# Patient Record
Sex: FEMALE | Race: WHITE | URBAN - METROPOLITAN AREA
[De-identification: names, ages, dates, MRNs, and addresses within clinical notes are randomized per-mention and may not be internally consistent; named-entity substitution may affect disease eponyms.]

---

## 2020-09-08 ENCOUNTER — PROCEDURE VISIT (OUTPATIENT)
Dept: OBGYN CLINIC | Age: 30
End: 2020-09-08
Payer: COMMERCIAL

## 2020-09-08 ENCOUNTER — HOSPITAL ENCOUNTER (OUTPATIENT)
Age: 30
Setting detail: SPECIMEN
Discharge: HOME | End: 2020-09-08
Payer: COMMERCIAL

## 2020-09-08 ENCOUNTER — OFFICE VISIT (OUTPATIENT)
Dept: OBGYN CLINIC | Age: 30
End: 2020-09-08
Payer: COMMERCIAL

## 2020-09-08 VITALS
HEIGHT: 67 IN | BODY MASS INDEX: 27.94 KG/M2 | WEIGHT: 178 LBS | DIASTOLIC BLOOD PRESSURE: 68 MMHG | SYSTOLIC BLOOD PRESSURE: 108 MMHG

## 2020-09-08 LAB
ABDOMINAL CIRCUMFERENCE: NORMAL
ABO/RH: NORMAL
ABSOLUTE EOS #: 0.07 K/UL (ref 0–0.44)
ABSOLUTE IMMATURE GRANULOCYTE: <0.03 K/UL (ref 0–0.3)
ABSOLUTE LYMPH #: 1.74 K/UL (ref 1.1–3.7)
ABSOLUTE MONO #: 0.37 K/UL (ref 0.1–1.2)
AMPHETAMINE SCREEN URINE: NEGATIVE
ANTIBODY SCREEN: NEGATIVE
BARBITURATE SCREEN URINE: NEGATIVE
BASOPHILS # BLD: 0 % (ref 0–2)
BASOPHILS ABSOLUTE: <0.03 K/UL (ref 0–0.2)
BENZODIAZEPINE SCREEN, URINE: NEGATIVE
BILIRUBIN URINE: NEGATIVE
BIPARIETAL DIAMETER: NORMAL
BUPRENORPHINE URINE: NORMAL
C. TRACHOMATIS, EXTERNAL RESULT: NEGATIVE
CANNABINOID SCREEN URINE: NEGATIVE
COCAINE METABOLITE, URINE: NEGATIVE
COLOR: YELLOW
COMMENT UA: NORMAL
CONTROL: PRESENT
DIFFERENTIAL TYPE: ABNORMAL
DIRECT EXAM: NORMAL
EOSINOPHILS RELATIVE PERCENT: 1 % (ref 1–4)
ESTIMATED FETAL WEIGHT: NORMAL
FEMORAL DIAMETER: NORMAL
GLUCOSE URINE: NEGATIVE
HC/AC: NORMAL
HCT VFR BLD CALC: 44.5 % (ref 36.3–47.1)
HEAD CIRCUMFERENCE: NORMAL
HEMOGLOBIN: 14.4 G/DL (ref 11.9–15.1)
HEPATITIS B SURFACE ANTIGEN: NONREACTIVE
HEPATITIS C ANTIBODY, EXTERNAL RESULT: NONREACTIVE
HEPATITIS C ANTIBODY: NONREACTIVE
HISTORY CHECK: NORMAL
HIV AG/AB: NONREACTIVE
IMMATURE GRANULOCYTES: 0 %
KETONES, URINE: NEGATIVE
LEUKOCYTE ESTERASE, URINE: NEGATIVE
LYMPHOCYTES # BLD: 25 % (ref 24–43)
Lab: NORMAL
MCH RBC QN AUTO: 28.7 PG (ref 25.2–33.5)
MCHC RBC AUTO-ENTMCNC: 32.4 G/DL (ref 28.4–34.8)
MCV RBC AUTO: 88.6 FL (ref 82.6–102.9)
MDMA URINE: NORMAL
METHADONE SCREEN, URINE: NEGATIVE
METHAMPHETAMINE, URINE: NORMAL
MONOCYTES # BLD: 5 % (ref 3–12)
N. GONORRHOEAE, EXTERNAL RESULT: NEGATIVE
NITRITE, URINE: NEGATIVE
NRBC AUTOMATED: 0 PER 100 WBC
OPIATES, URINE: NEGATIVE
OXYCODONE SCREEN URINE: NEGATIVE
PDW BLD-RTO: 12 % (ref 11.8–14.4)
PH UA: 6.5 (ref 5–8)
PHENCYCLIDINE, URINE: NEGATIVE
PLATELET # BLD: 228 K/UL (ref 138–453)
PLATELET ESTIMATE: ABNORMAL
PMV BLD AUTO: 11.4 FL (ref 8.1–13.5)
PREGNANCY TEST URINE, POC: POSITIVE
PROPOXYPHENE, URINE: NORMAL
PROTEIN UA: NEGATIVE
RBC # BLD: 5.02 M/UL (ref 3.95–5.11)
RBC # BLD: ABNORMAL 10*6/UL
RUBV IGG SER QL: 181.4 IU/ML
SEG NEUTROPHILS: 69 % (ref 36–65)
SEGMENTED NEUTROPHILS ABSOLUTE COUNT: 4.68 K/UL (ref 1.5–8.1)
SPECIFIC GRAVITY UA: 1.02 (ref 1–1.03)
SPECIMEN DESCRIPTION: NORMAL
T. PALLIDUM, IGG: NONREACTIVE
TEST INFORMATION: NORMAL
TRICYCLIC ANTIDEPRESSANTS, UR: NORMAL
TURBIDITY: CLEAR
URINE HGB: NEGATIVE
UROBILINOGEN, URINE: NORMAL
WBC # BLD: 6.9 K/UL (ref 3.5–11.3)
WBC # BLD: ABNORMAL 10*3/UL

## 2020-09-08 PROCEDURE — 76817 TRANSVAGINAL US OBSTETRIC: CPT | Performed by: OBSTETRICS & GYNECOLOGY

## 2020-09-08 PROCEDURE — 99203 OFFICE O/P NEW LOW 30 MIN: CPT | Performed by: NURSE PRACTITIONER

## 2020-09-08 PROCEDURE — 81025 URINE PREGNANCY TEST: CPT | Performed by: NURSE PRACTITIONER

## 2020-09-08 ASSESSMENT — ENCOUNTER SYMPTOMS
COUGH: 0
CONSTIPATION: 0
ABDOMINAL PAIN: 0
ABDOMINAL DISTENTION: 0
BACK PAIN: 0
SHORTNESS OF BREATH: 0
DIARRHEA: 0

## 2020-09-08 NOTE — PROGRESS NOTES
Roselyn Luevano is a 31 y.o.  at 9w4d with Estimated Date of Delivery: 21 who presents for prenatal care. This is a planned pregnancy : Yes  Patient's last menstrual period was 2020. Certain LMP : yes      Pregnancy symptoms include fatigue, breast tenderness, nausea, \"morning sickness\", positive home pregnancy test and frequent urination  nausea without vomiting for 20 days  full time job doing Works in [place] for [employer]  Pain Score   0/10  Partner's name Carmelo  Relationship with FOB: spouse, living together. Patientdoes intend to breast feed. Pregnancy history fully reviewed. Mother's ethnicity:   Father's ethnicity:          POB:   OB History    Para Term  AB Living   2       1     SAB TAB Ectopic Molar Multiple Live Births   1                # Outcome Date GA Lbr Davon/2nd Weight Sex Delivery Anes PTL Lv   2 Current            1 SAB 2020             Complications from previous pregnancies/deliveries  Early sab  PGYN: denies STDs; denies abnormal pap smears                      Menses regular yes  Contraception none    PMH:  has no past medical history on file. PSH:  has no past surgical history on file. FH:family history is not on file. SH: denies X 3, family supportive    Review of Systems   Constitutional: Negative for appetite change and fatigue. HENT: Negative for congestion and hearing loss. Eyes: Negative for visual disturbance. Respiratory: Negative for cough and shortness of breath. Cardiovascular: Negative for chest pain and palpitations. Gastrointestinal: Negative for abdominal distention, abdominal pain, constipation and diarrhea. Genitourinary: Negative for flank pain, frequency, menstrual problem, pelvic pain and vaginal discharge. Musculoskeletal: Negative for back pain. Neurological: Negative for syncope and headaches. Psychiatric/Behavioral: Negative for behavioral problems.          Physical exam:Wt 178 lb (80.7 kg)   LMP 2020   General Appearance: alert and oriented to person,place and time, well developed and well- nourished, in no acute distress  Skin: warm and dry, no rash or erythema  Head: normocephalic and atraumatic  Eyes: extraocular eye movements intact, conjunctivae normal  ENT:  external ear and ear canal normal bilaterally, nose without deformity, nasal mucosa normal   Neck: supple and non-tender without mass, no thyromegaly or thyroid nodules, no cervical lymphadenopathy  Pulmonary/Chest: clear to auscultation bilaterally- no wheezes, rales or rhonchi, normal air movement, no respiratory distress  Cardiovascular: normal rate, regular rhythm, normal S1 and S2, no murmurs, rubs, clicks, orgallops, distal pulses intact, no carotid bruits  Abdomen: soft, non-tender, non-distended, normal bowel sounds, no masses or organomegaly  Extremities: no cyanosis, clubbing or edema  Musculoskeletal: normal rangeof motion, no joint swelling, deformity or tenderness  Neurologic: reflexes normal and symmetric, no cranial nerve deficit, gait, coordination and speech normal  Breast: without skin retraction, dimpling, puckering, nipple discharge or masses. There is no axillary adenopathy      Pelvic: external genitalia WNL's, no rashes, no lesions. Speculum exam: vaginal vault pink, wellrugated, without lesions. No discharge. Cervix without lesions. Bimanual exam: no cervical motion tenderness. Impression: @ 9w4d by LMP             There is no problem list on file for this patient. Plan:    -Papand routine cultures to lab. -Options for genetic testing : declined.    -Return to clinic 2 weeks for Ultrasound and ACOG appointments.  -Prenatal vitamins          Orders Placed This Encounter   Procedures    C.trachomatis N.gonorrhoeae DNA    Culture, Urine    VAGINITIS DNA PROBE    CBC Auto Differential    Hepatitis B Surface Antigen Obstetric Panel    Hepatitis C Antibody    HIV Screen    PAP SMEAR    Rubella antibody, IgG    T. pallidum Ab    Urinalysis    Urine Drug Screen    POCT urine pregnancy    Type and screen

## 2020-09-09 LAB
C TRACH DNA GENITAL QL NAA+PROBE: NEGATIVE
CULTURE: NORMAL
Lab: NORMAL
N. GONORRHOEAE DNA: NEGATIVE
SPECIMEN DESCRIPTION: NORMAL
SPECIMEN DESCRIPTION: NORMAL

## 2020-09-17 LAB — CYTOLOGY REPORT: NORMAL

## 2020-09-22 ENCOUNTER — INITIAL PRENATAL (OUTPATIENT)
Dept: OBGYN CLINIC | Age: 30
End: 2020-09-22

## 2020-09-22 VITALS — WEIGHT: 179.2 LBS | BODY MASS INDEX: 28.07 KG/M2 | SYSTOLIC BLOOD PRESSURE: 114 MMHG | DIASTOLIC BLOOD PRESSURE: 80 MMHG

## 2020-09-22 PROCEDURE — 0502F SUBSEQUENT PRENATAL CARE: CPT | Performed by: NURSE PRACTITIONER

## 2020-09-22 NOTE — PATIENT INSTRUCTIONS
thickness of the area at the back of the baby's neck. An increase in the thickness can be an early sign of Down syndrome. ? Chorionic villus sampling (CVS). This is a test that looks for certain genetic problems with your baby. The same genes that are in your baby are in the placenta. A small piece of the placenta is taken out and tested. This test is done when you are 10 to 13 weeks pregnant. Ease discomfort  · Slow down and take naps when you feel tired. · If your emotions swing, talk to someone. Crying, anxiety, and concentration problems are common. · If your gums bleed, try a softer toothbrush. If your gums are puffy and bleed a lot, see your dentist.  · If you feel dizzy:  ? Get up slowly after sitting or lying down. ? Drink plenty of fluids. ? Eat small snacks to keep your blood sugar stable. ? Put your head between your legs as though you were tying your shoelaces. ? Lie down with your legs higher than your head. Use pillows to prop up your feet. · If you have a headache:  ? Lie down. ? Ask your partner or a good friend for a neck massage. ? Try cool cloths over your forehead or across the back of your neck. ? Use acetaminophen (Tylenol) for pain relief. Do not use nonsteroidal anti-inflammatory drugs (NSAIDs), such as ibuprofen (Advil, Motrin) or naproxen (Aleve), unless your doctor says it is okay. · If you have a nosebleed, pinch your nose gently, and hold it for a short while. To prevent nosebleeds, try massaging a small dab of petroleum jelly, such as Vaseline, in your nostrils. · If your nose is stuffed up, try saline (saltwater) nose sprays. Do not use decongestant sprays. Care for your breasts  · Wear a bra that gives you good support. · Know that changes in your breasts are normal.  ? Your breasts may get larger and more tender. Tenderness usually gets better by 12 weeks. ? Your nipples may get darker and larger, and small bumps around your nipples may show more. ?  The veins in your chest and breasts may show more. · Don't worry about \"toughening'\" your nipples. Breastfeeding will naturally do this. Where can you learn more? Go to [email or web address] Group. org and sign in to your [employer] account. Enter M318 in the [facility] box to learn more about \"Weeks 10 to 14 of Your Pregnancy: Care Instructions. \"     If you do not have an account, please click on the \"Sign Up Now\" link. Current as of: February 11, 2020               Content Version: 12.5  © 2851-5580 [employer]. Care instructions adapted under license by [facility] ([facility]). If you have questions about a medical condition or this instruction, always ask your healthcare professional. [place] [address] any warranty or liability for your use of this information. Patient Education        Learning About When to Call Your Doctor During Pregnancy (Up to 20 Weeks)  Your Care Instructions     It's common to have concerns about what might be a problem during pregnancy. Although most pregnant women don't have any serious problems, it's important to know when to call your doctor if you have certain symptoms. These are general suggestions. Your doctor may give you some more information about when to call. When to call your doctor (up to 20 weeks)  [id number] anytime you think you may need emergency care. For example, call if:  · You passed out (lost consciousness). Call your doctor now or seek immediate medical care if:  · You have a fever. · You have vaginal bleeding. · You are dizzy or lightheaded, or you feel like you may faint. · You have symptoms of a urinary tract infection. These may include:  ? Pain or burning when you urinate. ? A frequent need to urinate without being able to pass much urine. ? Pain in the flank, which is just below the rib cage and above the waist on either side of the back. ? Blood in your urine. · You have belly pain.   · You think you are having

## 2020-09-22 NOTE — PROGRESS NOTES
-LOF, -VB  There is no problem list on file for this patient. Blood pressure 114/80, weight 179 lb 3.2 oz (81.3 kg), last menstrual period 2020. Geroldine Cabot is a 31 y.o. , here for her ACOG. The patients past medical, surgical, social and family history were reviewed. Current medications and allergies were reviewed, and documented in the chart. Menstrual history: monthly  Birth control: none    Wt Readings from Last 3 Encounters:   20 179 lb 3.2 oz (81.3 kg)   20 178 lb (80.7 kg)     Recent Results (from the past 8736 hour(s))   US OB TRANSVAGINAL    Collection Time: 20 12:00 AM   Result Value Ref Range    Biparietal Diameter      Abdominal Circumference      Femoral Diameter      Head Circumference      HC/AC      Estimated Fetal Weight     C. Trachomatis, External Result    Collection Time: 20 12:00 AM   Result Value Ref Range    C. Trachomatis, External Result NEGATIVE    N. Gonorrhoeae, External Result    Collection Time: 20 12:00 AM   Result Value Ref Range    N.  Gonorrhoeae, External Result NEGATIVE    Hepatitis C Antibody, External Result    Collection Time: 20 12:00 AM   Result Value Ref Range    Hepatitis C Antibody, External Result NONREACTIVE    POCT urine pregnancy    Collection Time: 20  7:53 AM   Result Value Ref Range    Preg Test, Ur positive     Control present    CBC Auto Differential    Collection Time: 20  8:32 AM   Result Value Ref Range    WBC 6.9 3.5 - 11.3 k/uL    RBC 5.02 3.95 - 5.11 m/uL    Hemoglobin 14.4 11.9 - 15.1 g/dL    Hematocrit 44.5 36.3 - 47.1 %    MCV 88.6 82.6 - 102.9 fL    MCH 28.7 25.2 - 33.5 pg    MCHC 32.4 28.4 - 34.8 g/dL    RDW 12.0 11.8 - 14.4 %    Platelets 418 172 - 360 k/uL    MPV 11.4 8.1 - 13.5 fL    NRBC Automated 0.0 0.0 per 100 WBC    Differential Type NOT REPORTED     Seg Neutrophils 69 (H) 36 - 65 %    Lymphocytes 25 24 - 43 %    Monocytes 5 3 - 12 %    Eosinophils % 1 1 - 4 % Basophils 0 0 - 2 %    Immature Granulocytes 0 0 %    Segs Absolute 4.68 1.50 - 8.10 k/uL    Absolute Lymph # 1.74 1.10 - 3.70 k/uL    Absolute Mono # 0.37 0.10 - 1.20 k/uL    Absolute Eos # 0.07 0.00 - 0.44 k/uL    Basophils Absolute <0.03 0.00 - 0.20 k/uL    Absolute Immature Granulocyte <0.03 0.00 - 0.30 k/uL    WBC Morphology NOT REPORTED     RBC Morphology NOT REPORTED     Platelet Estimate NOT REPORTED    Culture, Urine    Collection Time: 09/08/20  8:32 AM    Specimen: Urine, clean catch   Result Value Ref Range    Specimen Description . CLEAN CATCH URINE     Special Requests NOT REPORTED     Culture NO SIGNIFICANT GROWTH    Hepatitis B Surface Antigen Obstetric Panel    Collection Time: 09/08/20  8:32 AM   Result Value Ref Range    Hepatitis B Surface Ag NONREACTIVE NONREACTIVE   Hepatitis C Antibody    Collection Time: 09/08/20  8:32 AM   Result Value Ref Range    Hepatitis C Ab NONREACTIVE NONREACTIVE   HIV Screen    Collection Time: 09/08/20  8:32 AM   Result Value Ref Range    HIV Ag/Ab NONREACTIVE NONREACTIVE   Rubella antibody, IgG    Collection Time: 09/08/20  8:32 AM   Result Value Ref Range    Rubella Antibody, .4 IU/mL   T. pallidum Ab    Collection Time: 09/08/20  8:32 AM   Result Value Ref Range    T. pallidum, IgG NONREACTIVE NONREACTIVE   TYPE AND SCREEN    Collection Time: 09/08/20  8:32 AM   Result Value Ref Range    ABO/Rh A POSITIVE     Antibody Screen NEGATIVE     History Check NO PREVIOUS HISTORY    Urinalysis    Collection Time: 09/08/20  8:32 AM   Result Value Ref Range    Color, UA YELLOW YELLOW    Turbidity UA CLEAR CLEAR    Glucose, Ur NEGATIVE NEGATIVE    Bilirubin Urine NEGATIVE NEGATIVE    Ketones, Urine NEGATIVE NEGATIVE    Specific Gravity, UA 1.025 1.005 - 1.030    Urine Hgb NEGATIVE NEGATIVE    pH, UA 6.5 5.0 - 8.0    Protein, UA NEGATIVE NEGATIVE    Urobilinogen, Urine Normal Normal    Nitrite, Urine NEGATIVE NEGATIVE    Leukocyte Esterase, Urine NEGATIVE NEGATIVE Urinalysis Comments       Microscopic exam not performed based on chemical results unless requested in original order. Urine Drug Screen    Collection Time: 09/08/20  8:32 AM   Result Value Ref Range    Amphetamine Screen, Ur NEGATIVE NEGATIVE    Barbiturate Screen, Ur NEGATIVE NEGATIVE    Benzodiazepine Screen, Urine NEGATIVE NEGATIVE    Cocaine Metabolite, Urine NEGATIVE NEGATIVE    Methadone Screen, Urine NEGATIVE NEGATIVE    Opiates, Urine NEGATIVE NEGATIVE    Phencyclidine, Urine NEGATIVE NEGATIVE    Propoxyphene, Urine NOT REPORTED NEGATIVE    Cannabinoid Scrn, Ur NEGATIVE NEGATIVE    Oxycodone Screen, Ur NEGATIVE NEGATIVE    Methamphetamine, Urine NOT REPORTED NEGATIVE    Tricyclic Antidepressants, Urine NOT REPORTED NEGATIVE    MDMA, Urine NOT REPORTED NEGATIVE    Buprenorphine Urine NOT REPORTED NEGATIVE    Test Information       Assay provides medical screening only. The absence of expected drug(s) and/or metabolite(s) may indicate diluted or adulterated urine, limitations of testing or timing of collection. GYN Cytology    Collection Time: 09/08/20 12:06 PM   Result Value Ref Range    Cytology Report       INTERPRETATION    Cervical material, (ThinPrep vial, Imaging-assisted review):  Specimen Adequacy:       Satisfactory for evaluation.       -Endocervical/transformation zone component is absent. Descriptive Diagnosis:       Negative for intraepithelial lesion or malignancy. Cytotechnologist:   Azalea ALEJO(ASCP)  **Electronically Signed Out**  ey/9/17/2020            Source:  1: Cervical material, (ThinPrep vial, Imaging-assisted review)    Clinical History  Amenorrhea: N91.2  High risk HPV DNA testing is requested if the diagnosis is abnormal    GYNECOLOGIC CYTOLOGY REPORT    Patient Name: [facility] Rec: [id number]  Path Number: [id number]  [address] [place] [address].   [facility], [facility] [place]  [phone]  Fax: [phone]     C.trachomatis N.gonorrhoeae DNA    Collection Time: 09/08/20  4:06 PM    Specimen: Cervix   Result Value Ref Range    Specimen Description . CERVIX     C. trachomatis DNA NEGATIVE NEGATIVE    N. gonorrhoeae DNA NEGATIVE NEGATIVE   VAGINITIS DNA PROBE    Collection Time: 09/08/20  4:07 PM    Specimen: Vaginal   Result Value Ref Range    Specimen Description . VAGINA     Special Requests NOT REPORTED     Direct Exam NEGATIVE for Candida sp. Direct Exam NEGATIVE for Gardnerella vaginalis     Direct Exam NEGATIVE for Trichomonas vaginalis     Direct Exam       Method of testing is a DNA probe intended for detection and identification of Candida species, Gardnerella vaginalis, and Trichomonas vaginalis nucleic acid in vaginal fluid specimens from patients with symptoms of vaginitis/vaginosis. History reviewed. No pertinent past medical history. History reviewed. No pertinent surgical history. Family History   Problem Relation Age of Onset    Pacemaker Paternal Grandfather     Heart Surgery Maternal Grandmother      Social History     Tobacco Use   Smoking Status Never Smoker   Smokeless Tobacco Never Used     Social History     Substance and Sexual Activity   Alcohol Use Not Currently       MEDICATIONS:  Current Outpatient Medications   Medication Sig Dispense Refill    Prenatal MV & Min w/FA-DHA (PRENATAL ADULT GUMMY/DHA/FA PO) Take by mouth       No current facility-administered medications for this visit. ALLERGIES:  Patient has no known allergies. Reviewed global and practice OB care including nausea measures, nutrition, activities, warning signs, and contact information.  Offered cell free DNA screen,NT echo and WIC .    `--------------------------------------------------------------------------  Genetic Screening/Teratology Counseling  (Include patient, FOB or anyone in either family)    1) Patient's age 28 years or > at [place]: No  2) Thalassemia (Mediterranean, ): No  3) Neural Tube Defect:   No  4) Congenital heart defect:   No  5) Trisomy (e.g. Down Syndrome):  No  6) Adam-sachs ([employer]alian, [address]): No  7) Multiple Births:    No  8) Sickle cell (disease or trait):  No  9) Hemophilia or blood disorders:  No  10) Muscular Dystrophy:   No  11) Cystic Fibrosis:    No  12) Therese's chorea:   No  13) Mental retardation/Autism :  No   If yes, was person tested for fragile X: NA  14) Other inherited genetic/chromosomal disorder: No  15) Maternal metabolic disorder (DM, PKU): No  16) Child with birth defect not listed:  No  17) Recurrent pregnancy loss/stillbirth: No  18) Medications, supplements/illicit or   Recreational drugs/alcohol since LMP: No   List: none  19) Any other:   none    Comments/Counseling: declines first trimester screening  -------------------------------------------------------------------------  Infection History:    1) Live with someone with TB/exposed to TB: No  2) Patient/partner has h/o genital herpes: No  3) Rash/viral illness since LMP:  No  4) History of STD:    No  5) Other: NA  -------------------------------------------------------------------------

## 2020-10-05 ENCOUNTER — TELEPHONE (OUTPATIENT)
Dept: OBGYN CLINIC | Age: 30
End: 2020-10-05

## 2020-10-06 ENCOUNTER — ROUTINE PRENATAL (OUTPATIENT)
Dept: OBGYN CLINIC | Age: 30
End: 2020-10-06

## 2020-10-06 VITALS — BODY MASS INDEX: 28.1 KG/M2 | SYSTOLIC BLOOD PRESSURE: 122 MMHG | DIASTOLIC BLOOD PRESSURE: 84 MMHG | WEIGHT: 179.4 LBS

## 2020-10-06 PROCEDURE — 0502F SUBSEQUENT PRENATAL CARE: CPT | Performed by: NURSE PRACTITIONER

## 2020-10-06 RX ORDER — PROMETHAZINE HYDROCHLORIDE 25 MG/1
25 TABLET ORAL EVERY 8 HOURS PRN
Qty: 30 TABLET | Refills: 0 | Status: SHIPPED | OUTPATIENT
Start: 2020-10-06 | End: 2020-10-13

## 2020-10-06 NOTE — PROGRESS NOTES
-FM yet, -Ctx, -LOF, -VB  There is no problem list on file for this patient. Blood pressure 122/84, weight 179 lb 6.4 oz (81.4 kg), last menstrual period 07/03/2020. Feeling well, but still experiencing nausea. Also has been having diarrhea (on and off for a month)- thinks it may me related to foods. Usually just loose stools. Will keep a food diary  Will call on Monday if still not feeling better after using Unisom.    Phenergan sent  No FM yet

## 2020-10-06 NOTE — PATIENT INSTRUCTIONS
you did not exercise much before pregnancy, start slowly. Walking is best. Renetta Sabal yourself, and do a little more every day. · Brisk walking, easy jogging, low-impact aerobics, water aerobics, and yoga are good choices. Some sports, such as scuba diving, horseback riding, downhill skiing, gymnastics, and water skiing, are not a good idea. · Try to do at least 2½ hours a week of moderate exercise, such as a fast walk. One way to do this is to be active 30 minutes a day, at least 5 days a week. It's fine to be active in blocks of 10 minutes or more throughout your day and week. · Wear loose clothing. And wear shoes and a bra that provide good support. · Warm up and cool down to start and finish your exercise. · If you want to use weights, be sure to use light weights. They reduce stress on your joints. Stay at the best weight for you  · Experts recommend that you gain about 1 pound a month during the first 3 months of your pregnancy. · Experts recommend that you gain about 1 pound a week during your last 6 months of pregnancy, for a total weight gain of 25 to 35 pounds. · If you are underweight, you will need to gain more weight (about 28 to 40 pounds). · If you are overweight, you may not need to gain as much weight (about 15 to 25 pounds). · If you are gaining weight too fast, use common sense. Exercise every day, and limit sweets, fast foods, and fats. Choose lean meats, fruits, and vegetables. · If you are having twins or more, your doctor may refer you to a dietitian. Where can you learn more? Go to https://SimpleepeFraudwall Technologieseb.healthAIM. org and sign in to your [employer] account. Enter K892 in the [facility] box to learn more about \"Weeks 14 to 18 of Your Pregnancy: Care Instructions. \"     If you do not have an account, please click on the \"Sign Up Now\" link. Current as of: February 11, 2020               Content Version: 12.5  © 3835-3315 Healthwise, Incorporated.    Care instructions adapted under license by [facility] ([facility]). If you have questions about a medical condition or this instruction, always ask your healthcare professional. [place] [address] any warranty or liability for your use of this information. Patient Education        Learning About When to Call Your Doctor During Pregnancy (Up to 20 Weeks)  Your Care Instructions     It's common to have concerns about what might be a problem during pregnancy. Although most pregnant women don't have any serious problems, it's important to know when to call your doctor if you have certain symptoms. These are general suggestions. Your doctor may give you some more information about when to call. When to call your doctor (up to 20 weeks)  [id number] anytime you think you may need emergency care. For example, call if:  · You passed out (lost consciousness). Call your doctor now or seek immediate medical care if:  · You have a fever. · You have vaginal bleeding. · You are dizzy or lightheaded, or you feel like you may faint. · You have symptoms of a urinary tract infection. These may include:  ? Pain or burning when you urinate. ? A frequent need to urinate without being able to pass much urine. ? Pain in the flank, which is just below the rib cage and above the waist on either side of the back. ? Blood in your urine. · You have belly pain. · You think you are having contractions. · You have a sudden release of fluid from your vagina. Watch closely for changes in your health, and be sure to contact your doctor if:  · You have vaginal discharge that smells bad. · You have other concerns about your pregnancy. Follow-up care is a key part of your treatment and safety. Be sure to make and go to all appointments, and call your doctor if you are having problems. It's also a good idea to know your test results and keep a list of the medicines you take. Where can you learn more? Go to https://chpegiseleeb.health-partners. org and sign in to your [employer] account. Enter W377 in the LIANAI box to learn more about \"Learning About When to Call Your Doctor During Pregnancy (Up to 20 Weeks). \"     If you do not have an account, please click on the \"Sign Up Now\" link. Current as of: February 11, 2020               Content Version: 12.5  © 6042-0111 Healthwise, Incorporated. Care instructions adapted under license by [facility] ([facility]). If you have questions about a medical condition or this instruction, always ask your healthcare professional. [address] any warranty or liability for your use of this information.

## 2020-10-20 ENCOUNTER — ROUTINE PRENATAL (OUTPATIENT)
Dept: OBGYN CLINIC | Age: 30
End: 2020-10-20

## 2020-10-20 VITALS
BODY MASS INDEX: 29.51 KG/M2 | TEMPERATURE: 98.7 F | DIASTOLIC BLOOD PRESSURE: 64 MMHG | HEIGHT: 67 IN | SYSTOLIC BLOOD PRESSURE: 108 MMHG | WEIGHT: 188 LBS

## 2020-10-20 PROBLEM — O03.9 SPONTANEOUS ABORTION: Status: ACTIVE | Noted: 2020-10-20

## 2020-10-20 PROBLEM — Z33.1 INCIDENTAL PREGNANCY: Status: ACTIVE | Noted: 2020-10-20

## 2020-10-20 PROCEDURE — 0502F SUBSEQUENT PRENATAL CARE: CPT | Performed by: STUDENT IN AN ORGANIZED HEALTH CARE EDUCATION/TRAINING PROGRAM

## 2020-10-20 RX ORDER — PROMETHAZINE HYDROCHLORIDE 25 MG/1
25 TABLET ORAL EVERY 6 HOURS PRN
COMMUNITY
End: 2021-04-02 | Stop reason: CLARIF

## 2020-11-04 ENCOUNTER — TELEPHONE (OUTPATIENT)
Dept: OBGYN CLINIC | Age: 30
End: 2020-11-04

## 2020-11-04 NOTE — TELEPHONE ENCOUNTER
OB 17.5wk Pt calling states she is having pain at her bra level going up towards her neck and goes across her shoulders. This pain is constant. Pt has a friend that physical therapist and recommended referral to Edd Panchal for upper back pain.      Req:Referral     Pain:  -now pain 7/10  -pain ranges from 5-10 out of 10  -at times has to breath thru pain  -pain constant    POC:  -Notify provider of pt c/o back pain  -offer referral per provider approval

## 2020-11-05 ENCOUNTER — OFFICE VISIT (OUTPATIENT)
Dept: ORTHOPEDIC SURGERY | Age: 30
End: 2020-11-05
Payer: COMMERCIAL

## 2020-11-05 PROCEDURE — 99203 OFFICE O/P NEW LOW 30 MIN: CPT | Performed by: PHYSICIAN ASSISTANT

## 2020-11-05 NOTE — PROGRESS NOTES
[address] [facility], [address] [facility], [address] [place] [address], [id number] [place] [address]           Dept Phone: [phone]           Dept Fax:  [phone] 320 [facility]           [place]          Dept Phone: [phone]           Dept Fax:  [phone]      Chief Compliant:  Chief Complaint   Patient presents with   Claudette Marti    Pain     mid back pain         History of Present Illness: This is a 31 y.o. female who is approximately 18 weeks pregnant who presents for evaluation of acute worsening of chronic low back pain. Patient states she has had mid and low back pain since she was in high school approximately 15 years that has waxed and waned. She states however her pain has progressively worsened over the last 2 months without any preceding injury or trauma. Patient states she has gained some weight this year due to inactivity and as well as the pregnancy which she believes is contributing to some of his pain. Pain is most severe to the mid back left greater than right side. Pain is at the level of the bra strap. Pain seems to be worse at night as well as with activity. Pain does seem to be relieved with rest.  Patient denies any red flag symptoms. She denies any radiation of pain into the low back or lower extremities. She denies any paresthesias, saddle anesthesia, bowel or bladder dysfunction. Patient denies any recent fever or chills. Patient states she takes the occasional Tylenol which does provide mild relief of her pain. She states one of her good friends is a physical therapist who has tried some home massages as well as dry needling which provides mild temporary relief. Patient has not had any formal physical therapy otherwise.     Past History:    Current Outpatient Medications:     promethazine (PHENERGAN) 25 MG tablet, Take 25 mg by mouth every 6 hours as needed for Nausea, Disp: , Rfl:     Prenatal MV & Min w/FA-DHA (PRENATAL ADULT GUMMY/DHA/FA PO), Take by mouth, Disp: , Rfl:   No Known Allergies  Social History     Socioeconomic History    Marital status:      Spouse name: Not on file    Number of children: Not on file    Years of education: Not on file    Highest education level: Not on file   Occupational History    Not on file   Social Needs    Financial resource strain: Not on file    Food insecurity     Worry: Not on file     Inability: Not on file    Transportation needs     Medical: Not on file     Non-medical: Not on file   Tobacco Use    Smoking status: Never Smoker    Smokeless tobacco: Never Used   Substance and Sexual Activity    Alcohol use: Not Currently    Drug use: Never    Sexual activity: Yes   Lifestyle    Physical activity     Days per week: Not on file     Minutes per session: Not on file    Stress: Not on file   Relationships    Social connections     Talks on phone: Not on file     Gets together: Not on file     Attends Mandaeism service: Not on file     Active member of club or organization: Not on file     Attends meetings of clubs or organizations: Not on file     Relationship status: Not on file    Intimate partner violence     Fear of current or ex partner: Not on file     Emotionally abused: Not on file     Physically abused: Not on file     Forced sexual activity: Not on file   Other Topics Concern    Not on file   Social History Narrative    Not on file     No past medical history on file. No past surgical history on file. Family History   Problem Relation Age of Onset    Pacemaker Paternal Grandfather     Heart Surgery Maternal Grandmother         Review of Systems   Constitutional: Negative for fever, chills, sweats. Eyes: Negative for changes in vision, or pain. HENT: Negative for ear ache, epistaxis, or sore throat.   Respiratory/Cardio: Negative for Chest pain, palpitations, SOB, or cough. Gastrointestinal: Negative for abdominal pain, N/V/D. Genitourinary: Negative for dysuria, frequency, urgency, or hematuria. Neurological: Negative for headache, numbness, or weakness. Integumentary: Negative for rash, itching, laceration, or abrasion. Musculoskeletal: Positive for Established New Doctor and Pain (mid back pain )       Physical Exam:  Constitutional: Patient is oriented to person, place, and time. Patient appears well-developed and well nourished. HENT: Negative otherwise noted  Head: Normocephalic and Atraumatic  Nose: Normal  Eyes: Conjunctivae and EOM are normal  Neck: Normal range of motion Neck supple. Respiratory/Cardio: Effort normal. No respiratory distress. Musculoskeletal:    LUMBAR SPINE:  Gait:  Normal. The patient can bear weight on the injured extremity. Tenderness:   thoracic spine, mild tenderness to the mid parathoracic musculature. No tenderness to the lumbar spine or paralumbar musculature. No tenderness to the cervical spine. Edema:   absent. Back ROM:  flexion 60   Extension:   +20   Lateral Rotation:  90/90   Lateral Bendin/50   Leg Lengths:   Equal   Atrophy:    is absent   Pulses:  2+ and symmetric   Strength:  abductor 5/5; quadraceps 5/5; hamstrings 5/5; adductors 5/5; iliopsoas 5/5, anterior tibial 5/5; gastrosolues 5/5; EHL 5/5; peroneal posterior tibial 5/5   Straight Leg Raise:  Negative bilaterally   Patellar Reflexes:  2+ bilaterally   Ankle Reflexes:  2+ bilaterally     Neurological: Patient is alert and oriented to person, place, and time. Normal strenght. No sensory deficit. Skin: Skin is warm and dry  Psychiatric: Behavior is normal. Thought content normal.  Nursing note and vitals reviewed. Labs and Imaging:     XR taken today:  No results found. X-rays were unable to be obtained as patient is currently 18 weeks pregnant.         Orders Placed This Encounter   Procedures   [facility] Physical Therapy - [facility]     Referral Priority:   Routine     Referral Type:   Eval and Treat     Referral Reason:   Specialty Services Required     Requested Specialty:   Physical Therapy     Number of Visits Requested:   1       Assessment and Plan:  1. Back pain affecting pregnancy in second trimester    2. Chronic bilateral thoracic back pain          PLAN  This is a 31 y.o. female who presents to the clinic today for evaluation of acute worsening of chronic low back pain. Patient states she has had pain for approximately 15 years however over the last 2 months as she progresses in this pregnancy her pain has progressively worsened. She did displays no red flag symptoms. Denying any paresthesias, unilateral weakness, saddle anesthesia, bowel or bladder dysfunction. X-rays were unable to be obtained as patient is currently 18 weeks pregnant. She is educated that this is likely muscular in nature as she is progressing through this pregnancy. There are things that patient can do lifestyle modification lines that may help reduce this pain. I recommend supportive athletic shoes with good arch support. Patient is also recommended to alternate ice and heat at home. She is further educated that she may benefit from a formal physical therapy referral for land and/or aquatic therapy. This referral is made today. We will see patient back in 2 months for reevaluation however she may call or return sooner for any questions or concerns          Please note that this chart was generated using voice recognition Dragon dictation software. Although every effort was made to ensure the accuracy of this automated transcription, some errors in transcription may have occurred.

## 2020-11-20 ENCOUNTER — ROUTINE PRENATAL (OUTPATIENT)
Dept: OBGYN CLINIC | Age: 30
End: 2020-11-20

## 2020-11-20 ENCOUNTER — PROCEDURE VISIT (OUTPATIENT)
Dept: OBGYN CLINIC | Age: 30
End: 2020-11-20
Payer: COMMERCIAL

## 2020-11-20 VITALS
DIASTOLIC BLOOD PRESSURE: 72 MMHG | TEMPERATURE: 97.7 F | BODY MASS INDEX: 30.85 KG/M2 | SYSTOLIC BLOOD PRESSURE: 128 MMHG | WEIGHT: 197 LBS

## 2020-11-20 LAB
ABDOMINAL CIRCUMFERENCE: NORMAL
ABDOMINAL CIRCUMFERENCE: NORMAL
BIPARIETAL DIAMETER: NORMAL
BIPARIETAL DIAMETER: NORMAL
ESTIMATED FETAL WEIGHT: NORMAL
ESTIMATED FETAL WEIGHT: NORMAL
FEMORAL DIAMETER: NORMAL
FEMORAL DIAMETER: NORMAL
HC/AC: NORMAL
HC/AC: NORMAL
HEAD CIRCUMFERENCE: NORMAL
HEAD CIRCUMFERENCE: NORMAL

## 2020-11-20 PROCEDURE — 76805 OB US >/= 14 WKS SNGL FETUS: CPT | Performed by: OBSTETRICS & GYNECOLOGY

## 2020-11-20 PROCEDURE — 0502F SUBSEQUENT PRENATAL CARE: CPT | Performed by: NURSE PRACTITIONER

## 2020-11-20 PROCEDURE — 76817 TRANSVAGINAL US OBSTETRIC: CPT | Performed by: OBSTETRICS & GYNECOLOGY

## 2020-11-20 NOTE — PATIENT INSTRUCTIONS
Patient Education        Weeks 18 to 22 of Your Pregnancy: Care Instructions  Your Care Instructions     Your baby is continuing to develop quickly. At this stage, babies can now suck their thumbs,  firmly with their hands, and open and close their eyelids. Sometime between 18 and 22 weeks, you will start to feel your baby move. At first, these small fetal movements feel like fluttering or \"butterflies. \" Some women say that they feel like gas bubbles. As the baby grows, these movements will become stronger. You may also notice that your baby kicks and hiccups. During this time, you may find that your nausea and fatigue are gone. Overall, you may feel better and have more energy than you did in your first trimester. But you may also have new discomforts now, such as sleep problems or leg cramps. This care sheet can help you ease these discomforts. Follow-up care is a key part of your treatment and safety. Be sure to make and go to all appointments, and call your doctor if you are having problems. It's also a good idea to know your test results and keep a list of the medicines you take. How can you care for yourself at home? Ease sleep problems  · Avoid caffeine in drinks or chocolate late in the day. · Get some exercise every day. · Take a warm shower or bath before bed. · Have a light snack or glass of milk at bedtime. · Do relaxation exercises in bed to calm your mind and body. · Support your legs and back with extra pillows. Try a pillow between your legs if you sleep on your side. · Do not use sleeping pills or alcohol. They could harm your baby. Ease leg cramps  · Do not massage your calf during the cramp. · Sit on a firm bed or chair. Straighten your leg, and bend your foot (flex your ankle) slowly upward, toward your knee. Bend your toes up and down. · Stand on a cool, flat surface. Stretch your toes upward, and take small steps walking on your heels.   · Use a heating pad or hot water bottle to help with muscle ache. Prevent leg cramps  · Be sure to get enough calcium. If you are worried that you are not getting enough, talk to your doctor. · Exercise every day, and stretch your legs before bed. · Take a warm bath before bed, and try leg warmers at night. Where can you learn more? Go to https://chpearaceli.healthIncentOne. org and sign in to your Stat Doctors account. Enter R705 in the DriverSide box to learn more about \"Weeks 18 to 22 of Your Pregnancy: Care Instructions. \"     If you do not have an account, please click on the \"Sign Up Now\" link. Current as of: February 11, 2020               Content Version: 12.6  © 2006-2020 RetailMLS. Care instructions adapted under license by [facility] ([facility]). If you have questions about a medical condition or this instruction, always ask your healthcare professional. [place] [address] any warranty or liability for your use of this information. Patient Education        Counting Your Baby's Kicks: Care Instructions  Your Care Instructions     Counting your baby's kicks is one way your doctor can tell that your baby is healthy. Most women--especially in a first pregnancy--feel their baby move for the first time between 16 and 22 weeks. The movement may feel like flutters rather than kicks. Your baby may move more at certain times of the day. When you are active, you may notice less kicking than when you are resting. At your prenatal visits, your doctor will ask whether the baby is active. In your last trimester, your doctor may ask you to count the number of times you feel your baby move. Follow-up care is a key part of your treatment and safety. Be sure to make and go to all appointments, and call your doctor if you are having problems. It's also a good idea to know your test results and keep a list of the medicines you take. How do you count fetal kicks?   · A common method of checking your baby's movement is to count the number of kicks or moves you feel in 1 hour. Ten movements (such as kicks, flutters, or rolls) in 1 hour are normal. Some doctors suggest that you count in the morning until you get to 10 movements. Then you can quit for that day and start again the next day. · Pick your baby's most active time of day to count. This may be any time from morning to evening. · If you do not feel 10 movements in an hour, your baby may be sleeping. Wait for the next hour and count again. When should you call for help? Call your doctor now or seek immediate medical care if:    · You noticed that your baby has stopped moving or is moving much less than normal.   Watch closely for changes in your health, and be sure to contact your doctor if you have any problems. Where can you learn more? Go to https://Luxe InternacionalepePublicBeta.NovaRay Medical. org and sign in to your iPerceptions account. Enter L534 in the Qianmi box to learn more about \"Counting Your Baby's Kicks: Care Instructions. \"     If you do not have an account, please click on the \"Sign Up Now\" link. Current as of: February 11, 2020               Content Version: 12.6  © 8093-4702 TripChamp. Care instructions adapted under license by [facility] ([facility]). If you have questions about a medical condition or this instruction, always ask your healthcare professional. [place] [address] any warranty or liability for your use of this information. Patient Education        Learning About When to Call Your Doctor During Pregnancy (After 20 Weeks)  Your Care Instructions  It's common to have concerns about what might be a problem during pregnancy. Although most pregnant women don't have any serious problems, it's important to know when to call your doctor if you have certain symptoms or signs of labor. These are general suggestions. Your doctor may give you some more information about when to call.   When to call your doctor (after 20 weeks)  Call 911 anytime you think you may need emergency care. For example, call if:  · You have severe vaginal bleeding. · You have sudden, severe pain in your belly. · You passed out (lost consciousness). · You have a seizure. · You see or feel the umbilical cord. · You think you are about to deliver your baby and can't make it safely to the hospital.  Call your doctor now or seek immediate medical care if:  · You have vaginal bleeding. · You have belly pain. · You have a fever. · You have symptoms of preeclampsia, such as:  ? Sudden swelling of your face, hands, or feet. ? New vision problems (such as dimness, blurring, or seeing spots). ? A severe headache. · You have a sudden release of fluid from your vagina. (You think your water broke.)  · You think that you may be in labor. This means that you've had at least 6 contractions in an hour. · You notice that your baby has stopped moving or is moving much less than normal.  · You have symptoms of a urinary tract infection. These may include:  ? Pain or burning when you urinate. ? A frequent need to urinate without being able to pass much urine. ? Pain in the flank, which is just below the rib cage and above the waist on either side of the back. ? Blood in your urine. Watch closely for changes in your health, and be sure to contact your doctor if:  · You have vaginal discharge that smells bad. · You have skin changes, such as:  ? A rash. ? Itching. ? Yellow color to your skin. · You have other concerns about your pregnancy. If you have labor signs at 37 weeks or more  If you have signs of labor at 37 weeks or more, your doctor may tell you to call when your labor becomes more active. Symptoms of active labor include:  · Contractions that are regular. · Contractions that are less than 5 minutes apart. · Contractions that are hard to talk through. Follow-up care is a key part of your treatment and safety.  Be sure to make and go to all appointments, and call your doctor if you are having problems. It's also a good idea to know your test results and keep a list of the medicines you take. Where can you learn more? Go to https://chpehugoeweb.IsoPlexis. org and sign in to your NGI account. Enter  in the [facility] box to learn more about \"Learning About When to Call Your Doctor During Pregnancy (After 20 Weeks). \"     If you do not have an account, please click on the \"Sign Up Now\" link. Current as of: February 11, 2020               Content Version: 12.6  © 2006-2020 TripChamp, Networked Organisms. Care instructions adapted under license by [facility] ([facility]). If you have questions about a medical condition or this instruction, always ask your healthcare professional. [address] any warranty or liability for your use of this information. Patient Education        High-Fiber Diet: Care Instructions  Your Care Instructions     A high-fiber diet may help you relieve constipation and feel less bloated. Your doctor and dietitian will help you make a high-fiber eating plan based on your personal needs. The plan will include the things you like to eat. It will also make sure that you get 30 grams of fiber a day. Before you make changes to the way you eat, be sure to talk with your doctor or dietitian. Follow-up care is a key part of your treatment and safety. Be sure to make and go to all appointments, and call your doctor if you are having problems. It's also a good idea to know your test results and keep a list of the medicines you take. How can you care for yourself at home? · You can increase how much fiber you get if you eat more of certain foods. These foods include:  ? Whole-grain breads and cereals. ? Fruits, such as pears, apples, and peaches. Eat the skins, peels, and seeds, if you can.  ? Vegetables, such as broccoli, cabbage, spinach, carrots, asparagus, and squash. ? Starchy vegetables. These include potatoes with skins, kidney beans, and lima beans. · Take a fiber supplement every day if your doctor recommends it. Examples are Benefiber, Citrucel, FiberCon, and Metamucil. Ask your doctor how much to take. · Drink plenty of fluids, enough so that your urine is light yellow or clear like water. If you have kidney, heart, or liver disease and have to limit fluids, talk with your doctor before you increase the amount of fluids you drink. · Get some exercise every day. Exercise helps stool move through the colon. It also helps prevent constipation. · Keep a food diary. Try to notice and write down what foods cause gas, pain, or other symptoms. Then you can avoid these foods. Where can you learn more? Go to https://360GuanxipeSideStripe.REALTIME.CO. org and sign in to your Adzilla account. Enter Y734 in the Reality Mobile box to learn more about \"High-Fiber Diet: Care Instructions. \"     If you do not have an account, please click on the \"Sign Up Now\" link. Current as of: August 22, 2019               Content Version: 12.6  © 4892-6449 etouches, Incorporated. Care instructions adapted under license by [facility] ([facility]). If you have questions about a medical condition or this instruction, always ask your healthcare professional. [address] any warranty or liability for your use of this information.

## 2020-11-20 NOTE — PROGRESS NOTES
+FM, -Ctx, -LOF, -VB  Patient Active Problem List   Diagnosis    Rh+/RI/GBSunk    Hx SAB x 1 (G1)     Blood pressure 128/72, temperature 97.7 °F (36.5 °C), weight 197 lb (89.4 kg), last menstrual period 07/03/2020. Reviewed kick counts, labor and pre eclampsia precautions  Feeling well  Anatomy scan complete- unremarkable  Noticing some movements  S/p flu  Having increasing back pain- low. Discussed referral for back brace. Pt agreeable. Also c/o some shoulder pain L>R.   Pt following all Covid-19 recommendations

## 2020-12-18 ENCOUNTER — ROUTINE PRENATAL (OUTPATIENT)
Dept: OBGYN CLINIC | Age: 30
End: 2020-12-18
Payer: COMMERCIAL

## 2020-12-18 VITALS
DIASTOLIC BLOOD PRESSURE: 68 MMHG | HEIGHT: 67 IN | TEMPERATURE: 98.3 F | SYSTOLIC BLOOD PRESSURE: 112 MMHG | WEIGHT: 201.6 LBS | BODY MASS INDEX: 31.64 KG/M2

## 2020-12-18 PROCEDURE — 99213 OFFICE O/P EST LOW 20 MIN: CPT | Performed by: STUDENT IN AN ORGANIZED HEALTH CARE EDUCATION/TRAINING PROGRAM

## 2020-12-18 NOTE — PROGRESS NOTES
Prenatal Visit    Andrés Devlin is a 31 y.o. female  at 18w0d    The patient was seen and evaluated. Reports positive fetal movements. She denies headache, vision changes, RUQ pain, contractions, vaginal bleeding and leakage of fluid. The patient already received the influenza vaccine this year. The problem list reflects the active issues addressed during today's visit    Vitals:    BP: 112/68  Weight: 201 lb 9.6 oz (91.4 kg)  Fundal Height (cm): 24 cm  Fetal Heart Rate: 145  Movement: Present     Labs: The patient is RH +, Rhogam not indicated  ABO/Rh   Date Value Ref Range Status   2020 A POSITIVE  Final       Assessment & Plan:  Andrés Devlin is a 31 y.o. female  at 18w0d   - The patients anatomy ultrasound has been completed and reviewed with patient. - 28 week labs to be ordered at next appt   -  labor and kick count precautions given. - Signs and symptoms of preeclampsia reviewed. Patient Active Problem List    Diagnosis Date Noted    Rh+/RI/GBSunk 10/20/2020    Hx SAB x 1 (G1) 10/20/2020     Return in about 4 weeks (around 1/15/2021) for LU. The patient was instructed on fetal kick counts and was given a kick sheet to complete every 8 hours. This is to begin at 28 weeks gestation. She was instructed that the baby should move at a minimum of ten times within one hour after a meal. The patient was instructed to lay down on her left side twenty minutes after eating and count movements for up to one hour with a target value of ten movements. She was instructed to notify the office if she did not make that target after two attempts or if after any attempt there was less than four movements.     [place] Arzola Koi [facility] Ob/Gyn   2020, 9:13 AM

## 2021-01-12 ENCOUNTER — TELEPHONE (OUTPATIENT)
Dept: OBGYN CLINIC | Age: 31
End: 2021-01-12

## 2021-01-12 RX ORDER — OMEPRAZOLE 20 MG/1
20 CAPSULE, DELAYED RELEASE ORAL
Qty: 180 CAPSULE | Refills: 1 | Status: SHIPPED | OUTPATIENT
Start: 2021-01-12 | End: 2021-04-02 | Stop reason: CLARIF

## 2021-01-12 RX ORDER — ONDANSETRON 8 MG/1
8 TABLET, ORALLY DISINTEGRATING ORAL EVERY 8 HOURS PRN
Qty: 12 TABLET | Refills: 1 | Status: SHIPPED | OUTPATIENT
Start: 2021-01-12 | End: 2021-04-02 | Stop reason: CLARIF

## 2021-01-12 NOTE — TELEPHONE ENCOUNTER
OB 27.4wk Pt states she is having severe heartburn, every time she eats fruits, vegtables coughs and at times vomits. Burning choking feeling for several weeks now. Vomits straight acid. Other findings:  -No problems with carbs  -states she drinks water and no pop    Pharmacy:  [facility]    Pt asking what she can take?     Advised:  -Reviewed meds on OB list.   -eat small frequent meals  -Next office visit 01/15/21

## 2021-01-15 ENCOUNTER — ROUTINE PRENATAL (OUTPATIENT)
Dept: OBGYN CLINIC | Age: 31
End: 2021-01-15
Payer: COMMERCIAL

## 2021-01-15 VITALS
SYSTOLIC BLOOD PRESSURE: 112 MMHG | TEMPERATURE: 98.4 F | WEIGHT: 207 LBS | DIASTOLIC BLOOD PRESSURE: 64 MMHG | BODY MASS INDEX: 32.49 KG/M2 | HEIGHT: 67 IN

## 2021-01-15 DIAGNOSIS — Z3A.28 28 WEEKS GESTATION OF PREGNANCY: Primary | ICD-10-CM

## 2021-01-15 DIAGNOSIS — Z23 NEED FOR TDAP VACCINATION: ICD-10-CM

## 2021-01-15 DIAGNOSIS — K21.9 GASTROESOPHAGEAL REFLUX DISEASE WITHOUT ESOPHAGITIS: ICD-10-CM

## 2021-01-15 PROCEDURE — 90471 IMMUNIZATION ADMIN: CPT | Performed by: STUDENT IN AN ORGANIZED HEALTH CARE EDUCATION/TRAINING PROGRAM

## 2021-01-15 PROCEDURE — 0502F SUBSEQUENT PRENATAL CARE: CPT | Performed by: STUDENT IN AN ORGANIZED HEALTH CARE EDUCATION/TRAINING PROGRAM

## 2021-01-15 PROCEDURE — 90715 TDAP VACCINE 7 YRS/> IM: CPT | Performed by: STUDENT IN AN ORGANIZED HEALTH CARE EDUCATION/TRAINING PROGRAM

## 2021-01-15 RX ORDER — OMEPRAZOLE 20 MG/1
20 TABLET, DELAYED RELEASE ORAL DAILY
Qty: 30 TABLET | Refills: 3 | Status: SHIPPED | OUTPATIENT
Start: 2021-01-15 | End: 2021-04-02 | Stop reason: CLARIF

## 2021-01-15 NOTE — PROGRESS NOTES
After obtaining consent, and per orders of Dr. Jenifer Carpenter, injection of Tdap 0.5mL given in Right deltoid IM by Eddie Batista. Patient instructed to remain in clinic for 20 minutes afterwards, and to report any adverse reaction to me immediately.     Andrei Hein 47 [id number]  LOT [id number]  EXP 10-28-22

## 2021-01-15 NOTE — PROGRESS NOTES
Prenatal Visit    Candace Ferrer is a 31 y.o. female  at 31w0d    The patient was seen and evaluated. Reports positive fetal movements. She denies headache, vision changes, RUQ pain, contractions, vaginal bleeding and leakage of fluid. Complaining of GERD symptoms. PPI sent to pharmacy on file. The patient was instructed on fetal kick counts and was given a kick sheet to complete every 8 hours. She was instructed that the baby should move at a minimum of ten times within one hour after a meal. The patient was instructed to lay down on her left side twenty minutes after eating and count movements for up to one hour with a target value of ten movements. She was instructed to notify the office if she did not make that target after two attempts or if after any attempt there was less than four movements. The patient admits to that the targets have been made. The patient requested the T-Dap Vaccine (27-36 weeks) this pregnancy. The patient already received the influenza vaccine this year. The problem list reflects the active issues addressed during today's visit    Vitals:    BP: 112/64  Weight: 207 lb (93.9 kg)  Fundal Height (cm): 28 cm  Fetal Heart Rate: 152  Movement: Present     28 Week Labs: The patient is RH +, Rhogam not indicated  ABO/Rh   Date Value Ref Range Status   2020 A POSITIVE  Final       1hr GTT: ordered   28 week CBC:   Lab Results   Component Value Date    WBC 6.9 2020    HGB 14.4 2020    HCT 44.5 2020    MCV 88.6 2020     2020     UA w/ Ur C&S: ordered     Assessment & Plan:  Candace Ferrer is a 31 y.o. female  at 31w0d   - 28 week labs ordered   - discussed recommendations for TDAP immunization, patient requested TDAP.   - Complaining of GERD symptoms. PPI sent to pharmacy on file. -  labor and kick count precautions given. - Signs and symptoms of preeclampsia reviewed.     Patient Active Problem List    Diagnosis Date Noted    Rh+/RI/GBSunk 10/20/2020    Hx SAB x 1 (G1) 10/20/2020     Return in about 2 weeks (around 2021) for LU. The patient was counseled on Labor & Delivery. Route of delivery and counseling on vaginal, operative vaginal, and  sections were completed with the risks of each to both the patient as well as her baby. The possibility of a blood transfusion was discussed as well. The patient was not opposed to receiving a transfusion if needed. The patient was counseled on types of analgesia during labor. The patient has been instructed to call the office at anytime prior to going into the hospital so the on-call physician may direct her to the appropriate facility for care. Exceptions were reviewed including but not limited to: Decreased fetal movement, vaginal Bleeding or hemorrhage, trauma, readily expectant delivery, or any instance where she feels 911 should be utilized.     Laurel Runner, [facility]   1/15/2021, 9:02 AM

## 2021-01-28 ENCOUNTER — HOSPITAL ENCOUNTER (OUTPATIENT)
Age: 31
Setting detail: SPECIMEN
Discharge: HOME | End: 2021-01-28
Payer: COMMERCIAL

## 2021-01-28 DIAGNOSIS — Z3A.28 28 WEEKS GESTATION OF PREGNANCY: ICD-10-CM

## 2021-01-28 DIAGNOSIS — D50.8 IRON DEFICIENCY ANEMIA SECONDARY TO INADEQUATE DIETARY IRON INTAKE: Primary | ICD-10-CM

## 2021-01-28 LAB
ABSOLUTE EOS #: 0.11 K/UL (ref 0–0.44)
ABSOLUTE IMMATURE GRANULOCYTE: 0.18 K/UL (ref 0–0.3)
ABSOLUTE LYMPH #: 1.37 K/UL (ref 1.1–3.7)
ABSOLUTE MONO #: 0.45 K/UL (ref 0.1–1.2)
BASOPHILS # BLD: 0 % (ref 0–2)
BASOPHILS ABSOLUTE: <0.03 K/UL (ref 0–0.2)
DIFFERENTIAL TYPE: ABNORMAL
EOSINOPHILS RELATIVE PERCENT: 1 % (ref 1–4)
GLUCOSE ADMINISTRATION: NORMAL
GLUCOSE TOLERANCE SCREEN 50G: 84 MG/DL (ref 70–135)
HCT VFR BLD CALC: 32.9 % (ref 36.3–47.1)
HEMOGLOBIN: 10.2 G/DL (ref 11.9–15.1)
IMMATURE GRANULOCYTES: 2 %
LYMPHOCYTES # BLD: 13 % (ref 24–43)
MCH RBC QN AUTO: 28.7 PG (ref 25.2–33.5)
MCHC RBC AUTO-ENTMCNC: 31 G/DL (ref 28.4–34.8)
MCV RBC AUTO: 92.7 FL (ref 82.6–102.9)
MONOCYTES # BLD: 4 % (ref 3–12)
NRBC AUTOMATED: 0 PER 100 WBC
PDW BLD-RTO: 11.9 % (ref 11.8–14.4)
PLATELET # BLD: 198 K/UL (ref 138–453)
PLATELET ESTIMATE: ABNORMAL
PMV BLD AUTO: 11.5 FL (ref 8.1–13.5)
RBC # BLD: 3.55 M/UL (ref 3.95–5.11)
RBC # BLD: ABNORMAL 10*6/UL
SEG NEUTROPHILS: 80 % (ref 36–65)
SEGMENTED NEUTROPHILS ABSOLUTE COUNT: 8.55 K/UL (ref 1.5–8.1)
WBC # BLD: 10.7 K/UL (ref 3.5–11.3)
WBC # BLD: ABNORMAL 10*3/UL

## 2021-01-28 RX ORDER — DOCUSATE SODIUM 100 MG/1
100 CAPSULE, LIQUID FILLED ORAL 2 TIMES DAILY
Qty: 60 CAPSULE | Refills: 3 | Status: SHIPPED | OUTPATIENT
Start: 2021-01-28 | End: 2021-04-02 | Stop reason: CLARIF

## 2021-01-28 RX ORDER — LANOLIN ALCOHOL/MO/W.PET/CERES
325 CREAM (GRAM) TOPICAL 2 TIMES DAILY
Qty: 90 TABLET | Refills: 3 | Status: ON HOLD | OUTPATIENT
Start: 2021-01-28 | End: 2021-04-17 | Stop reason: HOSPADM

## 2021-01-29 ENCOUNTER — ROUTINE PRENATAL (OUTPATIENT)
Dept: OBGYN CLINIC | Age: 31
End: 2021-01-29

## 2021-01-29 VITALS
SYSTOLIC BLOOD PRESSURE: 126 MMHG | HEIGHT: 67 IN | BODY MASS INDEX: 33.12 KG/M2 | TEMPERATURE: 97.5 F | WEIGHT: 211 LBS | DIASTOLIC BLOOD PRESSURE: 74 MMHG

## 2021-01-29 DIAGNOSIS — Z3A.30 30 WEEKS GESTATION OF PREGNANCY: Primary | ICD-10-CM

## 2021-01-29 PROCEDURE — 0502F SUBSEQUENT PRENATAL CARE: CPT | Performed by: STUDENT IN AN ORGANIZED HEALTH CARE EDUCATION/TRAINING PROGRAM

## 2021-01-29 NOTE — PROGRESS NOTES
LU.      The patient was counseled on Labor & Delivery. Route of delivery and counseling on vaginal, operative vaginal, and  sections were completed with the risks of each to both the patient as well as her baby. The possibility of a blood transfusion was discussed as well. The patient was not opposed to receiving a transfusion if needed. The patient was counseled on types of analgesia during labor. The patient has been instructed to call the office at anytime prior to going into the hospital so the on-call physician may direct her to the appropriate facility for care. Exceptions were reviewed including but not limited to: Decreased fetal movement, vaginal Bleeding or hemorrhage, trauma, readily expectant delivery, or any instance where she feels 911 should be utilized.     Kenya Castilloi [facility]   2021, 9:05 AM

## 2021-02-12 ENCOUNTER — ROUTINE PRENATAL (OUTPATIENT)
Dept: OBGYN CLINIC | Age: 31
End: 2021-02-12

## 2021-02-12 VITALS — WEIGHT: 213 LBS | DIASTOLIC BLOOD PRESSURE: 74 MMHG | BODY MASS INDEX: 33.36 KG/M2 | SYSTOLIC BLOOD PRESSURE: 122 MMHG

## 2021-02-12 DIAGNOSIS — Z33.1 INCIDENTAL PREGNANCY: Primary | ICD-10-CM

## 2021-02-12 DIAGNOSIS — Z3A.32 32 WEEKS GESTATION OF PREGNANCY: ICD-10-CM

## 2021-02-12 PROCEDURE — 0502F SUBSEQUENT PRENATAL CARE: CPT | Performed by: OBSTETRICS & GYNECOLOGY

## 2021-02-12 NOTE — PROGRESS NOTES
Chief complaint: Here for Prenatal Visit    +FM, -ctxns, -VB, -LOF    /74   Wt 213 lb (96.6 kg)   LMP 07/03/2020   BMI 33.36 kg/m²       Gen-NAD  CVS-RRR  Resp-nonlabored  Abd-soft, nontender, gravid  Ext-no edema      ICD-10-CM    1.  Rh+/RI/GBSunk  Z33.1    2. 32 weeks gestation of pregnancy  Z3A.32        S/p Tdap 1/15/21  Glucose WNL, Hg a bit low at 10.2  Struggling with heartburn, but slightly improved with Omeprazole  Discussed getting a breast pump

## 2021-02-23 ENCOUNTER — HOSPITAL ENCOUNTER (OUTPATIENT)
Age: 31
Setting detail: OBSERVATION
Discharge: HOME | End: 2021-02-24
Attending: STUDENT IN AN ORGANIZED HEALTH CARE EDUCATION/TRAINING PROGRAM | Admitting: STUDENT IN AN ORGANIZED HEALTH CARE EDUCATION/TRAINING PROGRAM
Payer: OTHER MISCELLANEOUS

## 2021-02-23 ENCOUNTER — HOSPITAL ENCOUNTER (EMERGENCY)
Facility: CLINIC | Age: 31
Discharge: HOME | End: 2021-02-23
Attending: EMERGENCY MEDICINE
Payer: OTHER MISCELLANEOUS

## 2021-02-23 VITALS
RESPIRATION RATE: 18 BRPM | TEMPERATURE: 98.3 F | WEIGHT: 212 LBS | HEART RATE: 109 BPM | DIASTOLIC BLOOD PRESSURE: 82 MMHG | BODY MASS INDEX: 33.27 KG/M2 | OXYGEN SATURATION: 98 % | HEIGHT: 67 IN | SYSTOLIC BLOOD PRESSURE: 124 MMHG

## 2021-02-23 DIAGNOSIS — S60.222A CONTUSION OF LEFT HAND, INITIAL ENCOUNTER: Primary | ICD-10-CM
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
## 2021-02-23 DIAGNOSIS — Z34.93 THIRD TRIMESTER PREGNANCY AT LESS THAN 36 WEEKS: ICD-10-CM

## 2021-02-23 PROCEDURE — 99284 EMERGENCY DEPT VISIT MOD MDM: CPT

## 2021-02-23 ASSESSMENT — PAIN SCALES - GENERAL: PAINLEVEL_OUTOF10: 4

## 2021-02-23 ASSESSMENT — PAIN DESCRIPTION - ORIENTATION: ORIENTATION: LOWER

## 2021-02-23 ASSESSMENT — PAIN DESCRIPTION - PAIN TYPE: TYPE: ACUTE PAIN

## 2021-02-23 ASSESSMENT — PAIN DESCRIPTION - LOCATION: LOCATION: ABDOMEN

## 2021-02-23 ASSESSMENT — PAIN DESCRIPTION - DESCRIPTORS: DESCRIPTORS: CRAMPING

## 2021-02-23 ASSESSMENT — ENCOUNTER SYMPTOMS: SHORTNESS OF BREATH: 0

## 2021-02-23 NOTE — ED NOTES
Patient was able to walk down the bates to the restroom and back without any issues. Patient c/o tailbone pain but her abdominal cramping has improved.      Mela Urbina RN  02/23/21 [id number]
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
## 2021-02-24 VITALS
DIASTOLIC BLOOD PRESSURE: 50 MMHG | TEMPERATURE: 98.2 F | HEART RATE: 97 BPM | RESPIRATION RATE: 20 BRPM | SYSTOLIC BLOOD PRESSURE: 108 MMHG

## 2021-02-24 PROBLEM — O09.93 HRP (HIGH RISK PREGNANCY), THIRD TRIMESTER: Status: ACTIVE | Noted: 2021-02-24

## 2021-02-24 PROCEDURE — 6370000000 HC RX 637 (ALT 250 FOR IP): Performed by: STUDENT IN AN ORGANIZED HEALTH CARE EDUCATION/TRAINING PROGRAM

## 2021-02-24 PROCEDURE — G0378 HOSPITAL OBSERVATION PER HR: HCPCS

## 2021-02-24 PROCEDURE — 99215 OFFICE O/P EST HI 40 MIN: CPT

## 2021-02-24 PROCEDURE — 76818 FETAL BIOPHYS PROFILE W/NST: CPT

## 2021-02-24 RX ORDER — ACETAMINOPHEN 500 MG
1000 TABLET ORAL EVERY 6 HOURS PRN
Status: DISCONTINUED | OUTPATIENT
Start: 2021-02-24 | End: 2021-02-24 | Stop reason: HOSPADM

## 2021-02-24 RX ADMIN — ACETAMINOPHEN 1000 MG: 500 TABLET ORAL at 15:21

## 2021-02-24 NOTE — FLOWSHEET NOTE
Pt states feels \"sharp pain\" when standing up in LLQ. Pt repositioned back et bed et monitors adjusted.
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
## 2021-02-26 ENCOUNTER — ROUTINE PRENATAL (OUTPATIENT)
Dept: OBGYN CLINIC | Age: 31
End: 2021-02-26
Payer: COMMERCIAL

## 2021-02-26 VITALS
HEIGHT: 67 IN | DIASTOLIC BLOOD PRESSURE: 68 MMHG | WEIGHT: 215.4 LBS | BODY MASS INDEX: 33.81 KG/M2 | SYSTOLIC BLOOD PRESSURE: 112 MMHG

## 2021-02-26 DIAGNOSIS — O36.8390 ANTEPARTUM FETAL TACHYCARDIA AFFECTING CARE OF MOTHER: ICD-10-CM

## 2021-02-26 DIAGNOSIS — Z3A.34 34 WEEKS GESTATION OF PREGNANCY: Primary | ICD-10-CM
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
## 2021-02-26 PROCEDURE — 0502F SUBSEQUENT PRENATAL CARE: CPT | Performed by: STUDENT IN AN ORGANIZED HEALTH CARE EDUCATION/TRAINING PROGRAM

## 2021-02-26 PROCEDURE — 59025 FETAL NON-STRESS TEST: CPT | Performed by: STUDENT IN AN ORGANIZED HEALTH CARE EDUCATION/TRAINING PROGRAM
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
## 2021-03-12 ENCOUNTER — ROUTINE PRENATAL (OUTPATIENT)
Dept: OBGYN CLINIC | Age: 31
End: 2021-03-12

## 2021-03-12 ENCOUNTER — HOSPITAL ENCOUNTER (OUTPATIENT)
Age: 31
Setting detail: SPECIMEN
Discharge: HOME | End: 2021-03-12
Payer: COMMERCIAL

## 2021-03-12 VITALS
HEIGHT: 67 IN | DIASTOLIC BLOOD PRESSURE: 60 MMHG | WEIGHT: 215 LBS | TEMPERATURE: 97.9 F | BODY MASS INDEX: 33.74 KG/M2 | SYSTOLIC BLOOD PRESSURE: 112 MMHG

## 2021-03-12 DIAGNOSIS — Z3A.36 36 WEEKS GESTATION OF PREGNANCY: ICD-10-CM

## 2021-03-12 DIAGNOSIS — Z3A.36 36 WEEKS GESTATION OF PREGNANCY: Primary | ICD-10-CM

## 2021-03-12 PROCEDURE — 0502F SUBSEQUENT PRENATAL CARE: CPT | Performed by: STUDENT IN AN ORGANIZED HEALTH CARE EDUCATION/TRAINING PROGRAM
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
## 2021-03-14 LAB
CULTURE: ABNORMAL
Lab: ABNORMAL
SPECIMEN DESCRIPTION: ABNORMAL

## 2021-03-19 ENCOUNTER — ROUTINE PRENATAL (OUTPATIENT)
Dept: OBGYN CLINIC | Age: 31
End: 2021-03-19

## 2021-03-19 VITALS — BODY MASS INDEX: 33.52 KG/M2 | DIASTOLIC BLOOD PRESSURE: 62 MMHG | WEIGHT: 214 LBS | SYSTOLIC BLOOD PRESSURE: 112 MMHG
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
## 2021-03-19 DIAGNOSIS — Z3A.37 37 WEEKS GESTATION OF PREGNANCY: Primary | ICD-10-CM

## 2021-03-19 DIAGNOSIS — D50.8 IRON DEFICIENCY ANEMIA SECONDARY TO INADEQUATE DIETARY IRON INTAKE: ICD-10-CM

## 2021-03-19 PROCEDURE — 0502F SUBSEQUENT PRENATAL CARE: CPT | Performed by: OBSTETRICS & GYNECOLOGY
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
## 2021-03-26 ENCOUNTER — ROUTINE PRENATAL (OUTPATIENT)
Dept: OBGYN CLINIC | Age: 31
End: 2021-03-26

## 2021-03-26 VITALS
TEMPERATURE: 97.9 F | HEIGHT: 67 IN | SYSTOLIC BLOOD PRESSURE: 116 MMHG | WEIGHT: 217 LBS | BODY MASS INDEX: 34.06 KG/M2 | DIASTOLIC BLOOD PRESSURE: 72 MMHG

## 2021-03-26 DIAGNOSIS — O03.9 SPONTANEOUS ABORTION: ICD-10-CM

## 2021-03-26 DIAGNOSIS — Z3A.38 38 WEEKS GESTATION OF PREGNANCY: Primary | ICD-10-CM

## 2021-03-26 PROCEDURE — 0502F SUBSEQUENT PRENATAL CARE: CPT | Performed by: STUDENT IN AN ORGANIZED HEALTH CARE EDUCATION/TRAINING PROGRAM
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
## 2021-04-02 ENCOUNTER — TELEPHONE (OUTPATIENT)
Dept: OBGYN CLINIC | Age: 31
End: 2021-04-02

## 2021-04-02 ENCOUNTER — ROUTINE PRENATAL (OUTPATIENT)
Dept: OBGYN CLINIC | Age: 31
End: 2021-04-02

## 2021-04-02 VITALS
DIASTOLIC BLOOD PRESSURE: 64 MMHG | SYSTOLIC BLOOD PRESSURE: 118 MMHG | WEIGHT: 217.6 LBS | BODY MASS INDEX: 34.08 KG/M2 | TEMPERATURE: 98.1 F

## 2021-04-02 DIAGNOSIS — Z3A.39 39 WEEKS GESTATION OF PREGNANCY: ICD-10-CM

## 2021-04-02 PROCEDURE — 0502F SUBSEQUENT PRENATAL CARE: CPT | Performed by: STUDENT IN AN ORGANIZED HEALTH CARE EDUCATION/TRAINING PROGRAM

## 2021-04-02 NOTE — TELEPHONE ENCOUNTER
Patients IOL moved from 4/9/21 to 4/16/21. Same time 8:00am. Pt notified and she will call surgery scheduling to reschedule her COVID screening.
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
## 2021-04-05 ENCOUNTER — HOSPITAL ENCOUNTER (OUTPATIENT)
Dept: LAB | Age: 31
Setting detail: SPECIMEN
Discharge: HOME | End: 2021-04-05
Payer: COMMERCIAL

## 2021-04-05 DIAGNOSIS — Z01.818 PREOP TESTING: Primary | ICD-10-CM

## 2021-04-09 ENCOUNTER — ROUTINE PRENATAL (OUTPATIENT)
Dept: OBGYN CLINIC | Age: 31
End: 2021-04-09

## 2021-04-09 VITALS — DIASTOLIC BLOOD PRESSURE: 74 MMHG | SYSTOLIC BLOOD PRESSURE: 116 MMHG | BODY MASS INDEX: 34.33 KG/M2 | WEIGHT: 219.2 LBS

## 2021-04-09 DIAGNOSIS — Z3A.40 40 WEEKS GESTATION OF PREGNANCY: Primary | ICD-10-CM

## 2021-04-09 PROCEDURE — 0502F SUBSEQUENT PRENATAL CARE: CPT | Performed by: STUDENT IN AN ORGANIZED HEALTH CARE EDUCATION/TRAINING PROGRAM

## 2021-04-09 NOTE — PROGRESS NOTES
10/20/2020     Pen G in labor      Hx SAB x 1 (G1) 10/20/2020     Return in about 3 weeks (around 4/30/2021) for PP Visit.     Kenya Greencarol [facility] Ob[facility]   4/9/2021, 10:44 AM

## 2021-04-13 ENCOUNTER — HOSPITAL ENCOUNTER (OUTPATIENT)
Dept: LAB | Age: 31
Setting detail: SPECIMEN
Discharge: HOME | End: 2021-04-13
Payer: COMMERCIAL

## 2021-04-13 DIAGNOSIS — Z01.818 PREOP TESTING: Primary | ICD-10-CM

## 2021-04-13 PROCEDURE — U0003 INFECTIOUS AGENT DETECTION BY NUCLEIC ACID (DNA OR RNA); SEVERE ACUTE RESPIRATORY SYNDROME CORONAVIRUS 2 (SARS-COV-2) (CORONAVIRUS DISEASE [COVID-19]), AMPLIFIED PROBE TECHNIQUE, MAKING USE OF HIGH THROUGHPUT TECHNOLOGIES AS DESCRIBED BY CMS-2020-01-R: HCPCS

## 2021-04-13 PROCEDURE — U0005 INFEC AGEN DETEC AMPLI PROBE: HCPCS

## 2021-04-14 LAB
SARS-COV-2: NORMAL
SARS-COV-2: NOT DETECTED
SOURCE: NORMAL

## 2021-04-15 ENCOUNTER — TELEPHONE (OUTPATIENT)
Dept: PRIMARY CARE CLINIC | Age: 31
End: 2021-04-15

## 2021-04-16 ENCOUNTER — HOSPITAL ENCOUNTER (INPATIENT)
Age: 31
LOS: 3 days | Discharge: HOME | DRG: 540 | End: 2021-04-19
Attending: STUDENT IN AN ORGANIZED HEALTH CARE EDUCATION/TRAINING PROGRAM | Admitting: STUDENT IN AN ORGANIZED HEALTH CARE EDUCATION/TRAINING PROGRAM
Payer: COMMERCIAL

## 2021-04-16 PROBLEM — O09.90 HIGH RISK PREGNANCY, ANTEPARTUM: Status: ACTIVE | Noted: 2021-04-16

## 2021-04-16 LAB
ABO/RH: NORMAL
ABSOLUTE EOS #: 0.11 K/UL (ref 0–0.44)
ABSOLUTE IMMATURE GRANULOCYTE: 0.18 K/UL (ref 0–0.3)
ABSOLUTE LYMPH #: 1.38 K/UL (ref 1.1–3.7)
ABSOLUTE MONO #: 0.55 K/UL (ref 0.1–1.2)
AMPHETAMINE SCREEN URINE: NEGATIVE
ANTIBODY SCREEN: NEGATIVE
ARM BAND NUMBER: NORMAL
BARBITURATE SCREEN URINE: NEGATIVE
BASOPHILS # BLD: 0 % (ref 0–2)
BASOPHILS ABSOLUTE: <0.03 K/UL (ref 0–0.2)
BENZODIAZEPINE SCREEN, URINE: NEGATIVE
BUPRENORPHINE URINE: NORMAL
CANNABINOID SCREEN URINE: NEGATIVE
COCAINE METABOLITE, URINE: NEGATIVE
DIFFERENTIAL TYPE: ABNORMAL
EOSINOPHILS RELATIVE PERCENT: 1 % (ref 1–4)
EXPIRATION DATE: NORMAL
HCT VFR BLD CALC: 27.4 % (ref 36.3–47.1)
HEMOGLOBIN: 8.7 G/DL (ref 11.9–15.1)
IMMATURE GRANULOCYTES: 2 %
LYMPHOCYTES # BLD: 14 % (ref 24–43)
MCH RBC QN AUTO: 28.2 PG (ref 25.2–33.5)
MCHC RBC AUTO-ENTMCNC: 31.8 G/DL (ref 28.4–34.8)
MCV RBC AUTO: 88.7 FL (ref 82.6–102.9)
MDMA URINE: NORMAL
METHADONE SCREEN, URINE: NEGATIVE
METHAMPHETAMINE, URINE: NORMAL
MONOCYTES # BLD: 5 % (ref 3–12)
NRBC AUTOMATED: 0 PER 100 WBC
OPIATES, URINE: NEGATIVE
OXYCODONE SCREEN URINE: NEGATIVE
PDW BLD-RTO: 13.5 % (ref 11.8–14.4)
PHENCYCLIDINE, URINE: NEGATIVE
PLATELET # BLD: 184 K/UL (ref 138–453)
PLATELET ESTIMATE: ABNORMAL
PMV BLD AUTO: 11.5 FL (ref 8.1–13.5)
PROPOXYPHENE, URINE: NORMAL
RBC # BLD: 3.09 M/UL (ref 3.95–5.11)
RBC # BLD: ABNORMAL 10*6/UL
SEG NEUTROPHILS: 78 % (ref 36–65)
SEGMENTED NEUTROPHILS ABSOLUTE COUNT: 7.9 K/UL (ref 1.5–8.1)
T. PALLIDUM, IGG: NONREACTIVE
TEST INFORMATION: NORMAL
TRICYCLIC ANTIDEPRESSANTS, UR: NORMAL
WBC # BLD: 10.1 K/UL (ref 3.5–11.3)
WBC # BLD: ABNORMAL 10*3/UL

## 2021-04-16 PROCEDURE — 2500000003 HC RX 250 WO HCPCS

## 2021-04-16 PROCEDURE — 86900 BLOOD TYPING SEROLOGIC ABO: CPT

## 2021-04-16 PROCEDURE — 80307 DRUG TEST PRSMV CHEM ANLYZR: CPT

## 2021-04-16 PROCEDURE — 2580000003 HC RX 258: Performed by: STUDENT IN AN ORGANIZED HEALTH CARE EDUCATION/TRAINING PROGRAM

## 2021-04-16 PROCEDURE — 1220000000 HC SEMI PRIVATE OB R&B

## 2021-04-16 PROCEDURE — 59200 INSERT CERVICAL DILATOR: CPT

## 2021-04-16 PROCEDURE — 6370000000 HC RX 637 (ALT 250 FOR IP): Performed by: STUDENT IN AN ORGANIZED HEALTH CARE EDUCATION/TRAINING PROGRAM

## 2021-04-16 PROCEDURE — 6360000002 HC RX W HCPCS: Performed by: STUDENT IN AN ORGANIZED HEALTH CARE EDUCATION/TRAINING PROGRAM

## 2021-04-16 PROCEDURE — 86850 RBC ANTIBODY SCREEN: CPT

## 2021-04-16 PROCEDURE — 86780 TREPONEMA PALLIDUM: CPT

## 2021-04-16 PROCEDURE — 86901 BLOOD TYPING SEROLOGIC RH(D): CPT

## 2021-04-16 PROCEDURE — 3E0P7VZ INTRODUCTION OF HORMONE INTO FEMALE REPRODUCTIVE, VIA NATURAL OR ARTIFICIAL OPENING: ICD-10-PCS | Performed by: STUDENT IN AN ORGANIZED HEALTH CARE EDUCATION/TRAINING PROGRAM

## 2021-04-16 PROCEDURE — 85025 COMPLETE CBC W/AUTO DIFF WBC: CPT

## 2021-04-16 RX ORDER — ACETAMINOPHEN 500 MG
1000 TABLET ORAL EVERY 6 HOURS PRN
Status: DISCONTINUED | OUTPATIENT
Start: 2021-04-16 | End: 2021-04-17

## 2021-04-16 RX ORDER — SODIUM CHLORIDE 9 MG/ML
25 INJECTION, SOLUTION INTRAVENOUS PRN
Status: DISCONTINUED | OUTPATIENT
Start: 2021-04-16 | End: 2021-04-17

## 2021-04-16 RX ORDER — ONDANSETRON 2 MG/ML
4 INJECTION INTRAMUSCULAR; INTRAVENOUS EVERY 6 HOURS PRN
Status: DISCONTINUED | OUTPATIENT
Start: 2021-04-16 | End: 2021-04-17

## 2021-04-16 RX ORDER — SODIUM CHLORIDE 0.9 % (FLUSH) 0.9 %
5-40 SYRINGE (ML) INJECTION EVERY 12 HOURS SCHEDULED
Status: DISCONTINUED | OUTPATIENT
Start: 2021-04-16 | End: 2021-04-17

## 2021-04-16 RX ORDER — SODIUM CHLORIDE 0.9 % (FLUSH) 0.9 %
10 SYRINGE (ML) INJECTION PRN
Status: DISCONTINUED | OUTPATIENT
Start: 2021-04-16 | End: 2021-04-17

## 2021-04-16 RX ORDER — PROMETHAZINE HYDROCHLORIDE 25 MG/ML
25 INJECTION, SOLUTION INTRAMUSCULAR; INTRAVENOUS ONCE
Status: COMPLETED | OUTPATIENT
Start: 2021-04-16 | End: 2021-04-16

## 2021-04-16 RX ORDER — SODIUM CHLORIDE, SODIUM LACTATE, POTASSIUM CHLORIDE, CALCIUM CHLORIDE 600; 310; 30; 20 MG/100ML; MG/100ML; MG/100ML; MG/100ML
INJECTION, SOLUTION INTRAVENOUS CONTINUOUS
Status: DISCONTINUED | OUTPATIENT
Start: 2021-04-16 | End: 2021-04-17

## 2021-04-16 RX ORDER — MORPHINE SULFATE 10 MG/ML
10 INJECTION, SOLUTION INTRAMUSCULAR; INTRAVENOUS ONCE
Status: COMPLETED | OUTPATIENT
Start: 2021-04-16 | End: 2021-04-16

## 2021-04-16 RX ADMIN — MORPHINE SULFATE 10 MG: 10 INJECTION INTRAVENOUS at 23:21

## 2021-04-16 RX ADMIN — SODIUM CHLORIDE, POTASSIUM CHLORIDE, SODIUM LACTATE AND CALCIUM CHLORIDE: 600; 310; 30; 20 INJECTION, SOLUTION INTRAVENOUS at 10:14

## 2021-04-16 RX ADMIN — SODIUM CHLORIDE, POTASSIUM CHLORIDE, SODIUM LACTATE AND CALCIUM CHLORIDE: 600; 310; 30; 20 INJECTION, SOLUTION INTRAVENOUS at 19:19

## 2021-04-16 RX ADMIN — Medication 50 MCG: at 20:59

## 2021-04-16 RX ADMIN — Medication 2.5 MILLION UNITS: at 14:00

## 2021-04-16 RX ADMIN — Medication 25 MCG: at 10:08

## 2021-04-16 RX ADMIN — PROMETHAZINE HYDROCHLORIDE 25 MG: 25 INJECTION INTRAMUSCULAR; INTRAVENOUS at 23:22

## 2021-04-16 RX ADMIN — Medication 2.5 MILLION UNITS: at 22:51

## 2021-04-16 RX ADMIN — Medication 2.5 MILLION UNITS: at 19:12

## 2021-04-16 RX ADMIN — DEXTROSE MONOHYDRATE 5 MILLION UNITS: 5 INJECTION INTRAVENOUS at 10:08

## 2021-04-16 RX ADMIN — ACETAMINOPHEN 1000 MG: 500 TABLET ORAL at 19:19

## 2021-04-16 RX ADMIN — Medication 25 MCG: at 15:21

## 2021-04-16 RX ADMIN — IRON SUCROSE 300 MG: 20 INJECTION, SOLUTION INTRAVENOUS at 11:45

## 2021-04-16 ASSESSMENT — PAIN SCALES - GENERAL: PAINLEVEL_OUTOF10: 4

## 2021-04-16 NOTE — PROGRESS NOTES
Labor Progress Note    Danya Head is a 31 y.o. female  at 37w0d  The patient was seen and examined. Her pain is well controlled. She reports fetal movement is present, denies contractions, denies loss of fluid, denies vaginal bleeding.        Vital Signs:  Vitals:    21 1002 21 1100 21 1447 21 1527   BP: 126/74 128/67 (!) 115/59 113/85   Pulse: 87 82 90 88   Resp: 18 18 18 16   Temp:    98.1 °F (36.7 °C)   TempSrc:    Oral   Weight:       Height:             FHT: 140, moderate variability, accelerations present, decelerations absent  Contractions: irregular    Cervical Exam: performed by RN; closed  Pitocin: @ 0 mu/min    Membranes: Intact  Scalp Electrode in place: absent  Intrauterine Pressure Catheter in Place: absent    Interventions: none    Assessment/Plan:  Danya Head is a 31 y.o. female  at 41w0d admitted for IOL 2/2 Late Term   - GBS positive, Pen G for GBS prophylaxis   - VSS, afebrile   - cEFM/TOCO - Cat 1 FHT, contractions irregular   - Cytotec 25 mcg PV x2, next at [address] expectant management          Attending updated and in agreement with plan    Hipolito Saint, DO  Ob/Gyn Resident  2021, 3:55 PM

## 2021-04-16 NOTE — PROGRESS NOTES
OB/GYN Resident Interval Note    Patient's Hgb 8.7 upon admission, Venofer ordered. VSS, patient clinically asymptomatic. She denies s/s anemia.     Vitals:    04/16/21 0826 04/16/21 0845 04/16/21 1002   BP: 133/80  126/74   Pulse: 103  87   Resp: 16  18   Temp: 98.3 °F (36.8 °C)     TempSrc: Oral     Weight:  219 lb (99.3 kg)    Height:  5' 7\" (1.702 m)        Recent Results (from the past 6 hour(s))   TYPE AND SCREEN    Collection Time: 04/16/21  9:30 AM   Result Value Ref Range    Expiration Date 04/19/2021,2359     Arm Band Number BE [id number]     ABO/Rh A POSITIVE     Antibody Screen NEGATIVE    T. pallidum Ab    Collection Time: 04/16/21  9:31 AM   Result Value Ref Range    T. pallidum, IgG NONREACTIVE NONREACTIVE   CBC auto differential    Collection Time: 04/16/21  9:31 AM   Result Value Ref Range    WBC 10.1 3.5 - 11.3 k/uL    RBC 3.09 (L) 3.95 - 5.11 m/uL    Hemoglobin 8.7 (L) 11.9 - 15.1 g/dL    Hematocrit 27.4 (L) 36.3 - 47.1 %    MCV 88.7 82.6 - 102.9 fL    MCH 28.2 25.2 - 33.5 pg    MCHC 31.8 28.4 - 34.8 g/dL    RDW 13.5 11.8 - 14.4 %    Platelets 626 589 - 263 k/uL    MPV 11.5 8.1 - 13.5 fL    NRBC Automated 0.0 0.0 per 100 WBC    Differential Type NOT REPORTED     Seg Neutrophils 78 (H) 36 - 65 %    Lymphocytes 14 (L) 24 - 43 %    Monocytes 5 3 - 12 %    Eosinophils % 1 1 - 4 %    Basophils 0 0 - 2 %    Immature Granulocytes 2 (H) 0 %    Segs Absolute 7.90 1.50 - 8.10 k/uL    Absolute Lymph # 1.38 1.10 - 3.70 k/uL    Absolute Mono # 0.55 0.10 - 1.20 k/uL    Absolute Eos # 0.11 0.00 - 0.44 k/uL    Basophils Absolute <0.03 0.00 - 0.20 k/uL    Absolute Immature Granulocyte 0.18 0.00 - 0.30 k/uL    WBC Morphology NOT REPORTED     RBC Morphology NOT REPORTED     Platelet Estimate NOT REPORTED    DRUG SCREEN MULTI URINE    Collection Time: 04/16/21  9:35 AM   Result Value Ref Range    Amphetamine Screen, Ur NEGATIVE NEGATIVE    Barbiturate Screen, Ur NEGATIVE NEGATIVE    Benzodiazepine Screen, Urine NEGATIVE NEGATIVE    Cocaine Metabolite, Urine NEGATIVE NEGATIVE    Methadone Screen, Urine NEGATIVE NEGATIVE    Opiates, Urine NEGATIVE NEGATIVE    Phencyclidine, Urine NEGATIVE NEGATIVE    Propoxyphene, Urine NOT REPORTED NEGATIVE    Cannabinoid Scrn, Ur NEGATIVE NEGATIVE    Oxycodone Screen, Ur NEGATIVE NEGATIVE    Methamphetamine, Urine NOT REPORTED NEGATIVE    Tricyclic Antidepressants, Urine NOT REPORTED NEGATIVE    MDMA, Urine NOT REPORTED NEGATIVE    Buprenorphine Urine NOT REPORTED NEGATIVE    Test Information       Assay provides medical screening only. The absence of expected drug(s) and/or metabolite(s) may indicate diluted or adulterated urine, limitations of testing or timing of collection.          Adelaida Ruelas DO  OB/GYN Resident  Pager #[phone]
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
## 2021-04-17 ENCOUNTER — ANESTHESIA EVENT (OUTPATIENT)
Dept: LABOR AND DELIVERY | Age: 31
DRG: 540 | End: 2021-04-17
Payer: COMMERCIAL

## 2021-04-17 ENCOUNTER — ANESTHESIA (OUTPATIENT)
Dept: LABOR AND DELIVERY | Age: 31
DRG: 540 | End: 2021-04-17
Payer: COMMERCIAL

## 2021-04-17 VITALS — TEMPERATURE: 96.8 F | DIASTOLIC BLOOD PRESSURE: 54 MMHG | SYSTOLIC BLOOD PRESSURE: 112 MMHG | OXYGEN SATURATION: 100 %

## 2021-04-17 LAB
-: NORMAL
ABSOLUTE EOS #: <0.03 K/UL (ref 0–0.44)
ABSOLUTE IMMATURE GRANULOCYTE: 0.15 K/UL (ref 0–0.3)
ABSOLUTE LYMPH #: 0.71 K/UL (ref 1.1–3.7)
ABSOLUTE MONO #: 0.34 K/UL (ref 0.1–1.2)
BASOPHILS # BLD: 0 % (ref 0–2)
BASOPHILS ABSOLUTE: 0.03 K/UL (ref 0–0.2)
DIFFERENTIAL TYPE: ABNORMAL
EOSINOPHILS RELATIVE PERCENT: 0 % (ref 1–4)
HCT VFR BLD CALC: 32.9 % (ref 36.3–47.1)
HEMOGLOBIN: 10.6 G/DL (ref 11.9–15.1)
IMMATURE GRANULOCYTES: 1 %
LYMPHOCYTES # BLD: 4 % (ref 24–43)
MCH RBC QN AUTO: 27.8 PG (ref 25.2–33.5)
MCHC RBC AUTO-ENTMCNC: 32.2 G/DL (ref 28.4–34.8)
MCV RBC AUTO: 86.4 FL (ref 82.6–102.9)
MONOCYTES # BLD: 2 % (ref 3–12)
NRBC AUTOMATED: 0 PER 100 WBC
PDW BLD-RTO: 13.3 % (ref 11.8–14.4)
PLATELET # BLD: 218 K/UL (ref 138–453)
PLATELET ESTIMATE: ABNORMAL
PMV BLD AUTO: 11.6 FL (ref 8.1–13.5)
RBC # BLD: 3.81 M/UL (ref 3.95–5.11)
RBC # BLD: ABNORMAL 10*6/UL
REASON FOR REJECTION: NORMAL
SEG NEUTROPHILS: 93 % (ref 36–65)
SEGMENTED NEUTROPHILS ABSOLUTE COUNT: 15.35 K/UL (ref 1.5–8.1)
WBC # BLD: 16.6 K/UL (ref 3.5–11.3)
WBC # BLD: ABNORMAL 10*3/UL
ZZ NTE CLEAN UP: ORDERED TEST: NORMAL
ZZ NTE WITH NAME CLEAN UP: SPECIMEN SOURCE: NORMAL

## 2021-04-17 PROCEDURE — 7100000001 HC PACU RECOVERY - ADDTL 15 MIN: Performed by: STUDENT IN AN ORGANIZED HEALTH CARE EDUCATION/TRAINING PROGRAM

## 2021-04-17 PROCEDURE — 3700000025 EPIDURAL BLOCK: Performed by: ANESTHESIOLOGY

## 2021-04-17 PROCEDURE — 6360000002 HC RX W HCPCS: Performed by: STUDENT IN AN ORGANIZED HEALTH CARE EDUCATION/TRAINING PROGRAM

## 2021-04-17 PROCEDURE — 1220000000 HC SEMI PRIVATE OB R&B

## 2021-04-17 PROCEDURE — 6360000002 HC RX W HCPCS: Performed by: ANESTHESIOLOGY

## 2021-04-17 PROCEDURE — 3609079900 HC CESAREAN SECTION: Performed by: STUDENT IN AN ORGANIZED HEALTH CARE EDUCATION/TRAINING PROGRAM

## 2021-04-17 PROCEDURE — 59514 CESAREAN DELIVERY ONLY: CPT | Performed by: STUDENT IN AN ORGANIZED HEALTH CARE EDUCATION/TRAINING PROGRAM

## 2021-04-17 PROCEDURE — 6360000002 HC RX W HCPCS: Performed by: NURSE ANESTHETIST, CERTIFIED REGISTERED

## 2021-04-17 PROCEDURE — 2500000003 HC RX 250 WO HCPCS: Performed by: NURSE ANESTHETIST, CERTIFIED REGISTERED

## 2021-04-17 PROCEDURE — 7100000000 HC PACU RECOVERY - FIRST 15 MIN: Performed by: STUDENT IN AN ORGANIZED HEALTH CARE EDUCATION/TRAINING PROGRAM

## 2021-04-17 PROCEDURE — 2580000003 HC RX 258: Performed by: STUDENT IN AN ORGANIZED HEALTH CARE EDUCATION/TRAINING PROGRAM

## 2021-04-17 PROCEDURE — 2580000003 HC RX 258: Performed by: NURSE ANESTHETIST, CERTIFIED REGISTERED

## 2021-04-17 PROCEDURE — 85025 COMPLETE CBC W/AUTO DIFF WBC: CPT

## 2021-04-17 PROCEDURE — 3700000000 HC ANESTHESIA ATTENDED CARE: Performed by: STUDENT IN AN ORGANIZED HEALTH CARE EDUCATION/TRAINING PROGRAM

## 2021-04-17 PROCEDURE — 88307 TISSUE EXAM BY PATHOLOGIST: CPT

## 2021-04-17 PROCEDURE — 96372 THER/PROPH/DIAG INJ SC/IM: CPT

## 2021-04-17 PROCEDURE — 59050 FETAL MONITOR W/REPORT: CPT

## 2021-04-17 PROCEDURE — 2709999900 HC NON-CHARGEABLE SUPPLY: Performed by: STUDENT IN AN ORGANIZED HEALTH CARE EDUCATION/TRAINING PROGRAM

## 2021-04-17 PROCEDURE — 3700000001 HC ADD 15 MINUTES (ANESTHESIA): Performed by: STUDENT IN AN ORGANIZED HEALTH CARE EDUCATION/TRAINING PROGRAM

## 2021-04-17 PROCEDURE — 6370000000 HC RX 637 (ALT 250 FOR IP): Performed by: STUDENT IN AN ORGANIZED HEALTH CARE EDUCATION/TRAINING PROGRAM

## 2021-04-17 RX ORDER — PHENYLEPHRINE HCL IN 0.9% NACL 1 MG/10 ML
SYRINGE (ML) INTRAVENOUS PRN
Status: DISCONTINUED | OUTPATIENT
Start: 2021-04-17 | End: 2021-04-17 | Stop reason: SDUPTHER

## 2021-04-17 RX ORDER — DOCUSATE SODIUM 100 MG/1
100 CAPSULE, LIQUID FILLED ORAL 2 TIMES DAILY
Status: DISCONTINUED | OUTPATIENT
Start: 2021-04-17 | End: 2021-04-19 | Stop reason: HOSPADM

## 2021-04-17 RX ORDER — ONDANSETRON 2 MG/ML
4 INJECTION INTRAMUSCULAR; INTRAVENOUS EVERY 6 HOURS PRN
Status: DISCONTINUED | OUTPATIENT
Start: 2021-04-17 | End: 2021-04-19 | Stop reason: HOSPADM

## 2021-04-17 RX ORDER — LIDOCAINE HYDROCHLORIDE 20 MG/ML
INJECTION, SOLUTION EPIDURAL; INFILTRATION; INTRACAUDAL; PERINEURAL PRN
Status: DISCONTINUED | OUTPATIENT
Start: 2021-04-17 | End: 2021-04-17 | Stop reason: SDUPTHER

## 2021-04-17 RX ORDER — MORPHINE SULFATE 10 MG/ML
10 INJECTION, SOLUTION INTRAMUSCULAR; INTRAVENOUS ONCE
Status: COMPLETED | OUTPATIENT
Start: 2021-04-17 | End: 2021-04-17

## 2021-04-17 RX ORDER — NALOXONE HYDROCHLORIDE 0.4 MG/ML
0.4 INJECTION, SOLUTION INTRAMUSCULAR; INTRAVENOUS; SUBCUTANEOUS PRN
Status: DISCONTINUED | OUTPATIENT
Start: 2021-04-17 | End: 2021-04-17

## 2021-04-17 RX ORDER — LANOLIN ALCOHOL/MO/W.PET/CERES
325 CREAM (GRAM) TOPICAL 2 TIMES DAILY
Qty: 60 TABLET | Refills: 3 | Status: SHIPPED | OUTPATIENT
Start: 2021-04-17 | End: 2022-05-27

## 2021-04-17 RX ORDER — PROMETHAZINE HYDROCHLORIDE 25 MG/ML
25 INJECTION, SOLUTION INTRAMUSCULAR; INTRAVENOUS ONCE
Status: COMPLETED | OUTPATIENT
Start: 2021-04-17 | End: 2021-04-17

## 2021-04-17 RX ORDER — HYDROCODONE BITARTRATE AND ACETAMINOPHEN 5; 325 MG/1; MG/1
2 TABLET ORAL EVERY 4 HOURS PRN
Status: DISCONTINUED | OUTPATIENT
Start: 2021-04-17 | End: 2021-04-19 | Stop reason: HOSPADM

## 2021-04-17 RX ORDER — ONDANSETRON 2 MG/ML
4 INJECTION INTRAMUSCULAR; INTRAVENOUS EVERY 6 HOURS PRN
Status: DISCONTINUED | OUTPATIENT
Start: 2021-04-17 | End: 2021-04-17

## 2021-04-17 RX ORDER — ACETAMINOPHEN 500 MG
1000 TABLET ORAL EVERY 6 HOURS PRN
Status: DISCONTINUED | OUTPATIENT
Start: 2021-04-17 | End: 2021-04-19 | Stop reason: HOSPADM

## 2021-04-17 RX ORDER — KETOROLAC TROMETHAMINE 30 MG/ML
30 INJECTION, SOLUTION INTRAMUSCULAR; INTRAVENOUS EVERY 6 HOURS
Status: DISPENSED | OUTPATIENT
Start: 2021-04-17 | End: 2021-04-18

## 2021-04-17 RX ORDER — POLYETHYLENE GLYCOL 3350 17 G/17G
17 POWDER, FOR SOLUTION ORAL DAILY PRN
Status: DISCONTINUED | OUTPATIENT
Start: 2021-04-17 | End: 2021-04-19 | Stop reason: HOSPADM

## 2021-04-17 RX ORDER — HYDROCODONE BITARTRATE AND ACETAMINOPHEN 5; 325 MG/1; MG/1
1 TABLET ORAL EVERY 4 HOURS PRN
Status: DISCONTINUED | OUTPATIENT
Start: 2021-04-17 | End: 2021-04-19 | Stop reason: HOSPADM

## 2021-04-17 RX ORDER — ROPIVACAINE HYDROCHLORIDE 2 MG/ML
INJECTION, SOLUTION EPIDURAL; INFILTRATION; PERINEURAL PRN
Status: DISCONTINUED | OUTPATIENT
Start: 2021-04-17 | End: 2021-04-17 | Stop reason: SDUPTHER

## 2021-04-17 RX ORDER — NALBUPHINE HCL 10 MG/ML
5 AMPUL (ML) INJECTION EVERY 4 HOURS PRN
Status: DISCONTINUED | OUTPATIENT
Start: 2021-04-17 | End: 2021-04-17

## 2021-04-17 RX ORDER — TRISODIUM CITRATE DIHYDRATE AND CITRIC ACID MONOHYDRATE 500; 334 MG/5ML; MG/5ML
30 SOLUTION ORAL ONCE
Status: COMPLETED | OUTPATIENT
Start: 2021-04-17 | End: 2021-04-17

## 2021-04-17 RX ORDER — IBUPROFEN 600 MG/1
600 TABLET ORAL EVERY 6 HOURS PRN
Qty: 120 TABLET | Refills: 0 | Status: SHIPPED | OUTPATIENT
Start: 2021-04-17 | End: 2022-05-27

## 2021-04-17 RX ORDER — LIDOCAINE HYDROCHLORIDE AND EPINEPHRINE 15; 5 MG/ML; UG/ML
INJECTION, SOLUTION EPIDURAL PRN
Status: DISCONTINUED | OUTPATIENT
Start: 2021-04-17 | End: 2021-04-17 | Stop reason: SDUPTHER

## 2021-04-17 RX ORDER — MORPHINE SULFATE 1 MG/ML
INJECTION, SOLUTION EPIDURAL; INTRATHECAL; INTRAVENOUS PRN
Status: DISCONTINUED | OUTPATIENT
Start: 2021-04-17 | End: 2021-04-17 | Stop reason: SDUPTHER

## 2021-04-17 RX ORDER — SODIUM CHLORIDE 0.9 % (FLUSH) 0.9 %
10 SYRINGE (ML) INJECTION PRN
Status: DISCONTINUED | OUTPATIENT
Start: 2021-04-17 | End: 2021-04-19 | Stop reason: HOSPADM

## 2021-04-17 RX ORDER — IBUPROFEN 600 MG/1
600 TABLET ORAL EVERY 6 HOURS
Status: DISCONTINUED | OUTPATIENT
Start: 2021-04-18 | End: 2021-04-19 | Stop reason: HOSPADM

## 2021-04-17 RX ORDER — TRANEXAMIC ACID 10 MG/ML
1000 INJECTION, SOLUTION INTRAVENOUS ONCE
Status: DISCONTINUED | OUTPATIENT
Start: 2021-04-17 | End: 2021-04-19 | Stop reason: HOSPADM

## 2021-04-17 RX ORDER — LANOLIN 72 %
OINTMENT (GRAM) TOPICAL
Status: DISCONTINUED | OUTPATIENT
Start: 2021-04-17 | End: 2021-04-19 | Stop reason: HOSPADM

## 2021-04-17 RX ORDER — DIPHENHYDRAMINE HYDROCHLORIDE 50 MG/ML
25 INJECTION INTRAMUSCULAR; INTRAVENOUS EVERY 6 HOURS PRN
Status: DISCONTINUED | OUTPATIENT
Start: 2021-04-17 | End: 2021-04-19 | Stop reason: HOSPADM

## 2021-04-17 RX ORDER — HYDROCODONE BITARTRATE AND ACETAMINOPHEN 5; 325 MG/1; MG/1
1 TABLET ORAL EVERY 6 HOURS PRN
Qty: 20 TABLET | Refills: 0 | Status: SHIPPED | OUTPATIENT
Start: 2021-04-17 | End: 2021-04-22

## 2021-04-17 RX ORDER — VITAMIN A, ASCORBIC ACID, CHOLECALCIFEROL, .ALPHA.-TOCOPHEROL ACETATE, DL-, THIAMINE MONONITRATE, RIBOFLAVIN, NIACINAMIDE, PYRIDOXINE HYDROCHLORIDE, FOLIC ACID, CYANOCOBALAMIN, CALCIUM CARBONATE, IRON, ZINC OXIDE, AND CUPRIC OXIDE 4000; 120; 400; 22; 1.84; 3; 20; 10; 1; 12; 200; 29; 25; 2 [IU]/1; MG/1; [IU]/1; [IU]/1; MG/1; MG/1; MG/1; MG/1; MG/1; UG/1; MG/1; MG/1; MG/1; MG/1
1 TABLET ORAL DAILY
Status: DISCONTINUED | OUTPATIENT
Start: 2021-04-17 | End: 2021-04-19 | Stop reason: HOSPADM

## 2021-04-17 RX ORDER — KETOROLAC TROMETHAMINE 30 MG/ML
INJECTION, SOLUTION INTRAMUSCULAR; INTRAVENOUS PRN
Status: DISCONTINUED | OUTPATIENT
Start: 2021-04-17 | End: 2021-04-17 | Stop reason: SDUPTHER

## 2021-04-17 RX ORDER — NALOXONE HYDROCHLORIDE 0.4 MG/ML
0.4 INJECTION, SOLUTION INTRAMUSCULAR; INTRAVENOUS; SUBCUTANEOUS PRN
Status: DISCONTINUED | OUTPATIENT
Start: 2021-04-17 | End: 2021-04-19 | Stop reason: HOSPADM

## 2021-04-17 RX ORDER — DOCUSATE SODIUM 100 MG/1
100 CAPSULE, LIQUID FILLED ORAL 2 TIMES DAILY PRN
Qty: 60 CAPSULE | Refills: 1 | Status: SHIPPED | OUTPATIENT
Start: 2021-04-17 | End: 2022-05-27

## 2021-04-17 RX ORDER — SODIUM CHLORIDE, SODIUM LACTATE, POTASSIUM CHLORIDE, CALCIUM CHLORIDE 600; 310; 30; 20 MG/100ML; MG/100ML; MG/100ML; MG/100ML
INJECTION, SOLUTION INTRAVENOUS CONTINUOUS
Status: DISCONTINUED | OUTPATIENT
Start: 2021-04-17 | End: 2021-04-18

## 2021-04-17 RX ORDER — SODIUM CHLORIDE, SODIUM LACTATE, POTASSIUM CHLORIDE, CALCIUM CHLORIDE 600; 310; 30; 20 MG/100ML; MG/100ML; MG/100ML; MG/100ML
INJECTION, SOLUTION INTRAVENOUS CONTINUOUS PRN
Status: DISCONTINUED | OUTPATIENT
Start: 2021-04-17 | End: 2021-04-17 | Stop reason: SDUPTHER

## 2021-04-17 RX ORDER — ROPIVACAINE HYDROCHLORIDE 2 MG/ML
INJECTION, SOLUTION EPIDURAL; INFILTRATION; PERINEURAL
Status: COMPLETED
Start: 2021-04-17 | End: 2021-04-17

## 2021-04-17 RX ORDER — SIMETHICONE 80 MG
80 TABLET,CHEWABLE ORAL EVERY 6 HOURS PRN
Status: DISCONTINUED | OUTPATIENT
Start: 2021-04-17 | End: 2021-04-19 | Stop reason: HOSPADM

## 2021-04-17 RX ORDER — SODIUM CHLORIDE 9 MG/ML
25 INJECTION, SOLUTION INTRAVENOUS PRN
Status: DISCONTINUED | OUTPATIENT
Start: 2021-04-17 | End: 2021-04-19 | Stop reason: HOSPADM

## 2021-04-17 RX ORDER — TRANEXAMIC ACID 100 MG/ML
INJECTION, SOLUTION INTRAVENOUS PRN
Status: DISCONTINUED | OUTPATIENT
Start: 2021-04-17 | End: 2021-04-17 | Stop reason: SDUPTHER

## 2021-04-17 RX ORDER — BISACODYL 10 MG
10 SUPPOSITORY, RECTAL RECTAL DAILY PRN
Status: DISCONTINUED | OUTPATIENT
Start: 2021-04-17 | End: 2021-04-19 | Stop reason: HOSPADM

## 2021-04-17 RX ADMIN — Medication 2.5 MILLION UNITS: at 11:59

## 2021-04-17 RX ADMIN — SODIUM BICARBONATE 1 MEQ: 84 INJECTION, SOLUTION INTRAVENOUS at 14:51

## 2021-04-17 RX ADMIN — ROPIVACAINE HYDROCHLORIDE 5 ML: 2 INJECTION, SOLUTION EPIDURAL; INFILTRATION at 11:47

## 2021-04-17 RX ADMIN — Medication 200 MCG: at 15:22

## 2021-04-17 RX ADMIN — SODIUM CHLORIDE, POTASSIUM CHLORIDE, SODIUM LACTATE AND CALCIUM CHLORIDE: 600; 310; 30; 20 INJECTION, SOLUTION INTRAVENOUS at 10:42

## 2021-04-17 RX ADMIN — CEFAZOLIN SODIUM 2000 MG: 10 INJECTION, POWDER, FOR SOLUTION INTRAVENOUS at 14:22

## 2021-04-17 RX ADMIN — Medication 200 MCG: at 14:36

## 2021-04-17 RX ADMIN — Medication 300 MCG: at 15:27

## 2021-04-17 RX ADMIN — LIDOCAINE HYDROCHLORIDE 5 ML: 20 INJECTION, SOLUTION EPIDURAL; INFILTRATION; INTRACAUDAL; PERINEURAL at 14:51

## 2021-04-17 RX ADMIN — SODIUM BICARBONATE 1 MEQ: 84 INJECTION, SOLUTION INTRAVENOUS at 14:47

## 2021-04-17 RX ADMIN — LIDOCAINE HYDROCHLORIDE 10 ML: 20 INJECTION, SOLUTION EPIDURAL; INFILTRATION; INTRACAUDAL; PERINEURAL at 14:47

## 2021-04-17 RX ADMIN — SODIUM CHLORIDE, POTASSIUM CHLORIDE, SODIUM LACTATE AND CALCIUM CHLORIDE: 600; 310; 30; 20 INJECTION, SOLUTION INTRAVENOUS at 14:45

## 2021-04-17 RX ADMIN — ROPIVACAINE HYDROCHLORIDE 10 ML/HR: 2 INJECTION, SOLUTION EPIDURAL; INFILTRATION at 11:53

## 2021-04-17 RX ADMIN — Medication 100 MCG: at 14:39

## 2021-04-17 RX ADMIN — DIPHENHYDRAMINE HYDROCHLORIDE 25 MG: 50 INJECTION, SOLUTION INTRAMUSCULAR; INTRAVENOUS at 19:49

## 2021-04-17 RX ADMIN — Medication 500 MG: at 14:23

## 2021-04-17 RX ADMIN — ROPIVACAINE HYDROCHLORIDE 5 ML: 2 INJECTION, SOLUTION EPIDURAL; INFILTRATION at 11:44

## 2021-04-17 RX ADMIN — LIDOCAINE HYDROCHLORIDE,EPINEPHRINE BITARTRATE 3 ML: 15; .005 INJECTION, SOLUTION EPIDURAL; INFILTRATION; INTRACAUDAL; PERINEURAL at 11:41

## 2021-04-17 RX ADMIN — TRANEXAMIC ACID 1000 MG: 100 INJECTION, SOLUTION INTRAVENOUS at 15:05

## 2021-04-17 RX ADMIN — Medication 100 MCG: at 15:16

## 2021-04-17 RX ADMIN — Medication 100 MCG: at 14:56

## 2021-04-17 RX ADMIN — LIDOCAINE HYDROCHLORIDE 3 ML: 20 INJECTION, SOLUTION EPIDURAL; INFILTRATION; INTRACAUDAL; PERINEURAL at 15:20

## 2021-04-17 RX ADMIN — KETOROLAC TROMETHAMINE 30 MG: 30 INJECTION, SOLUTION INTRAMUSCULAR; INTRAVENOUS at 15:34

## 2021-04-17 RX ADMIN — MORPHINE SULFATE 3 MG: 1 INJECTION, SOLUTION EPIDURAL; INTRATHECAL; INTRAVENOUS at 15:14

## 2021-04-17 RX ADMIN — PROMETHAZINE HYDROCHLORIDE 25 MG: 25 INJECTION INTRAMUSCULAR; INTRAVENOUS at 10:06

## 2021-04-17 RX ADMIN — MORPHINE SULFATE 10 MG: 10 INJECTION INTRAVENOUS at 10:06

## 2021-04-17 RX ADMIN — IRON SUCROSE 200 MG: 20 INJECTION, SOLUTION INTRAVENOUS at 17:36

## 2021-04-17 RX ADMIN — LIDOCAINE HYDROCHLORIDE 5 ML: 20 INJECTION, SOLUTION EPIDURAL; INFILTRATION; INTRACAUDAL; PERINEURAL at 14:57

## 2021-04-17 RX ADMIN — Medication 1 MILLI-UNITS/MIN: at 01:00

## 2021-04-17 RX ADMIN — Medication 1 MILLI-UNITS/MIN: at 05:01

## 2021-04-17 RX ADMIN — SODIUM CITRATE AND CITRIC ACID MONOHYDRATE 30 ML: 500; 334 SOLUTION ORAL at 14:23

## 2021-04-17 RX ADMIN — Medication 2.5 MILLION UNITS: at 04:00

## 2021-04-17 RX ADMIN — Medication 2.5 MILLION UNITS: at 08:08

## 2021-04-17 ASSESSMENT — PULMONARY FUNCTION TESTS
PIF_VALUE: 0
PIF_VALUE: 3
PIF_VALUE: 1
PIF_VALUE: 0
PIF_VALUE: 0
PIF_VALUE: 1
PIF_VALUE: 0
PIF_VALUE: 1
PIF_VALUE: 0
PIF_VALUE: 0
PIF_VALUE: 1
PIF_VALUE: 0

## 2021-04-17 ASSESSMENT — PAIN SCALES - GENERAL: PAINLEVEL_OUTOF10: 7

## 2021-04-17 NOTE — FLOWSHEET NOTE
Patient admitted to room 733 from recovery via stretcher. Oriented to room and surroundings. Plan of care reviewed. Verbalized understanding. Instructed on infant security and safe sleep practices. Preventing falls education provided . The following handouts given: A New Beginning: Your Guide to Postpartum Care, Rounding, gs Security System,Babies Cry A lot, Safe Sleep, Security and Visitation Guidelines. Call light placed within reach.

## 2021-04-17 NOTE — PROGRESS NOTES
Labor Progress Note    Charles Fregoso is a 31 y.o. female  at 40w1d  The patient was seen and examined. Her pain is well controlled. She reports fetal movement is present, complains of contractions, complains of loss of fluid, denies vaginal bleeding. Patient denies any headache, visual changes, difficulty breathing, RUQ pain, N/V, F/C, and pain/swelling in lower extremities. Denies any dysuria or vaginal discharge. SVE performed and IUPC placed patient tolerated exam well. Vital Signs:  Vitals:    21 0800 21 0900 21 1013 21 1015   BP: (!) 104/55 118/64 131/78    Pulse: 80 77 83    Resp: 16 16  16   Temp: 98.4 °F (36.9 °C)   98.6 °F (37 °C)   TempSrc: Oral   Oral   Weight:       Height:             FHT: 130, moderate variability, accelerations present, late deceleration while patient was laying flat for SVE with spontaneous return to baseline.    Contractions: regular, every 2-3 minutes    Chaperone for Intimate Exam: Chaperone was present for entire exam, Chaperone Name: Christina Batres RN  Cervical Exam: 1 cm dilated, 90 effaced, -2 station  Pitocin: @ 10 mu/min    Membranes: Ruptured clear fluid  Scalp Electrode in place: absent  Intrauterine Pressure Catheter in Place: placed    Interventions: SVE performed and IUPC placed    Assessment/Plan:  Charles Fregoso is a 31 y.o. female  at 41w1d admitted for IOL 2/2 late term   - GBS positive, Pen G for GBS prophylaxis   - VSS, afebrile   - cEFM/TOCO   - SVE /-2   - SROM (clr) @    - IUPC placed   - S/p Morphine/Phenergan x2 for pain control   - Continue pitocin per protocol    Anemia   - VSS   - Clinically asymptomatic   - Hgb 8.7 on admission   - S/p Venofer x1   - Plan for iron supplementation PP   - Continue to monitor closely        Dr. Tonya Jacobs updated and in agreement with plan    Kristie Webster DO  Ob/Gyn Resident  2021, 10:46 AM
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
## 2021-04-17 NOTE — PROGRESS NOTES
Patient seen and examined at bedside. Patient has not made cervical change in over 12 hours. IUPC has been in place for almost 4 hours with no change noted despite adequate contractions. Fetal heart tracing no noted to have subtle late decelerations with contractions. At this time, recommendation would be for primary  section as patient has made no cervical change in over 12 hours and fetal heart tones showing a category 2 tracing. Patient and partner are amenable to plan of care and would like to proceed with primary  section at this time. Ancef and azithromycin ordered for surgical prophylaxis. Anesthesia and NICU team have been notified. Risks, benefits and alternatives of  section discussed, including but not limited to bleeding, scarring, infection, injury to surrounding tissues (bowel, bladder, nerves, vessels, infant, etc.), need for more surgery (e.g. Hysterectomy), need for blood or blood products, maternal or fetal death, post-op clots of lung of legs, and pneumonia. All questions answered and written consent obtained.       Kenya Cali Kopci [facility] Ob/Gyn   2021, 2:17 PM

## 2021-04-17 NOTE — L&D DELIVERY NOTE
Mother's Information    Labor Events     labor?: No  Rupture type: Spontaneous=SROM     Mother Delivery Information    Episiotomy: None  Lacerations: None  Repair Suture: None  Surgical or Additional Est. Blood Loss (mL): 0 (View Only): Edit in Flowsheets   Combined Est. Blood Loss (mL): 0        Alexy, Baby Boy Cricket Zuleta [[id number]]    Labor Events     labor?: No   steroids?: None  Cervical ripening date/time:     Cervical ripening type: Misoprostol  Antibiotics received during labor?: Yes  Rupture Identifier: Sac 1   Rupture date/time: 21 20:42:00   Rupture type: Spontaneous=SROM  Fluid color: Clear  Fluid odor: None  Induction: Misoprostol, AROM, Oxytocin  Indications for induction: Post-term Gestation  Augmentation: None  Labor complications: None          Anesthesia    Method: Epidural  Analgesics: NAROPIN 10 MG/ML IJ SOLN     Assisted Delivery Details    Forceps attempted?: No  Vacuum extractor attempted?: No     Document Additional Attempt       Document Additional Attempt             Shoulder Dystocia    Shoulder dystocia present?: No  Add Second Maneuver  Add Third Maneuver  Add Fourth Maneuver  Add Fifth Maneuver  Add Sixth Maneuver  Add Seventh Maneuver  Add Eighth Maneuver  Add Ninth Maneuver      Presentation    Presentation: Vertex      Information    Head delivery date/time: 2021 15:08:00   Changing the 's delivery date/time could affect patient care.:    Delivery date/time:  21 1508     Details:        Delivery Providers    Delivering clinician: Bart Felty,    Provider Role    Ja Brown RN Delivery Nurse    Amado Harry, DO Resident      Apgars    Living status: Living  Apgars   1 Minute:  5 Minute:  10 Minute 15 Minute 20 Minute   Skin Color: 0  1       Heart Rate: 2  2       Reflex Irritability: 2  2       Muscle Tone: 2  2       Respiratory Effort: 2  2       Total: 8  9               [facility] Assigned By: Ada Bates

## 2021-04-17 NOTE — PROGRESS NOTES
Labor Progress Note    Leon Mena is a 31 y.o. female  at 40w1d  The patient was seen and examined. Her pain is well controlled. She reports fetal movement is present, complains of contractions, complains of loss of fluid, denies vaginal bleeding. SVE performed, forebag palpated, AROM performed of forebag. Patient tolerated well.      Vital Signs:  Vitals:    21 0900 21 1013 21 1015 21 1110   BP: 118/64 131/78  139/76   Pulse: 77 83  81   Resp:    Temp:   98.6 °F (37 °C)    TempSrc:   Oral    Weight:       Height:            FHT: 130, moderate variability, accelerations present, decelerations absent  Contractions: irregular, every 1-5 minutes    Chaperone for Intimate Exam: Chaperone was present for entire exam, Chaperone Name: Michelle Kumar RN  Cervical Exam: 1-2 cm dilated, 90 effaced, -1 station  Pitocin: @ 10 mu/min    Membranes: Ruptured clear fluid  Scalp Electrode in place: absent  Intrauterine Pressure Catheter in Place: present    Interventions: AROM forebag performed    Assessment/Plan:  Leon Mena is a 31 y.o. female  at 40w1d admitted for IOL 2/2 late term   - GBS positive, Pen G for GBS prophylaxis   - Isolated elevated BP, otherwise VSS   - cEFM/TOCO   - Denies s/s preE              - S/p cytotec 25mcg PV x2, cytotec 50mcg buccal x1              - S/p morphine/phenergan x2              - SROM (clr) @ on    - AROM forebag performed   - Continue pitocin per protocol   - IUPC in place    Attending updated and in agreement with plan    Carie Mathew DO  Ob/Gyn Resident  2021, 12:06 PM

## 2021-04-17 NOTE — ANESTHESIA PROCEDURE NOTES
Epidural Block    Patient location during procedure: OB  Start time: 4/17/2021 11:36 AM  End time: 4/17/2021 11:40 AM  Reason for block: labor epidural  Staffing  Performed: resident/CRNA   Anesthesiologist: Mookie Neely MD  Resident/CRNA: THAIS Brannon CRNA  Preanesthetic Checklist  Completed: patient identified, IV checked, site marked, risks and benefits discussed, surgical consent, monitors and equipment checked, pre-op evaluation, timeout performed, anesthesia consent given, oxygen available and patient being monitored  Epidural  Patient position: sitting  Prep: Betadine  Patient monitoring: continuous pulse ox and frequent blood pressure checks  Approach: midline  Location: lumbar (1-5)  Injection technique: SHERRI air and SHERRI saline  Provider prep: mask and sterile gloves  Needle  Needle type: Tuohy   Needle gauge: 17 G  Needle length: 3.5 in  Needle insertion depth: 6 cm  Catheter type: end hole  Catheter at skin depth: 12 cm  Test dose: negative  Assessment  Hemodynamics: stable  Attempts: 1

## 2021-04-17 NOTE — OP NOTE
Operative Note  Department of Obstetrics and Gynecology  [facility]     Patient: Sukhjinder Ngo   : 1990  MRN: [id number]       Acct: [de-identified]   PCP: No primary care provider on file. Date of Procedure: 21    Pre-operative Diagnosis: 31 y.o. female  at 41w1d    Category 2 FHT (late decelerations), remote from delivery  Failed IOL  IOL 2/2 late term  Anemia (Hgb 8.7)  BMI 34    Post-operative Diagnosis: Living  male infant, same as above    Procedure: primary low transverse  section    Indications: Sukhjinder Ngo 31 y.o.  admitted for IOL 2/2 late term. After cytotec 25mcg PV x2 and cytotec buccal 50mcg x1 patient had minimal cervical change. Patient had spontaneous rupture of membranes for over 15 hours. She did not dilate past 1 cm and went over 12 hours with no cervical change despite pitocin with adequate contractions (with IUPC) for 4 hours. In addition, FHT was category 2 (late decelerations). Due to category 2 FHT remote from delivery and no cervical change despite adequate contractions, decision was made to proceed with  delivery. All R/B/A were reviewed and patient and her partner were in agreement. All questions/concerns were addressed. She was given Ancef, Azithromycin, Bicitra, and 1g TXA IV preoperatively. Patient had epidural in place which was redosed prior to procedure. Surgeon: Bobby Gan DO  Assistants: Adri Thomas, PGY3; Grzegorz Spangler, PGY2    Anesthesia: epidural with duramorph    Procedure Details   The patient was seen pre-operatively. The risks, benefits, complications, treatment options, and expected outcomes were discussed with the patient. The patient concurred with the proposed plan, giving informed consent. The patient was taken to the Operating Room, identified as Sukhjinder Ngo and the procedure verified as  Delivery. A Time Out was held and the above information confirmed.      After redosing of epidural anesthesia, the patient was draped and prepped in the usual sterile manner. A Pfannenstiel incision was made and carried down through the subcutaneous tissue to the fascia using scalpel. Fascial incision was made and extended transversely using stewart scissors for sharp dissection. The fascia was  from the underlying rectus tissue superiorly and inferiorly using blunt dissection. The peritoneum was identified and entered bluntly. Blunt dissection were used to take the peritoneum down. Peritoneal incision was extended longitudinally with blunt stretch, bladder retractor was placed. A low transverse uterine incision was made using a new scalpel blade. Blunt stretch on the hysterotomy incision was made revealing clear fluid. Delivered from cephalic presentation was a Live Born male infant. The infant was suctioned, dried and the umbilical cord was clamped and cut after one minute delayed cord clamping. The infant was taken to the warmer and attended by NICU for evaluation. A second section of cord was clamped and cut and sent for gases. Cord blood was obtained for evaluation. The placenta was removed spontaneously with gentle traction and appeared intact and that the umbilical cord had three vessels noted. Pitocin was started. The uterine outline appeared normal. The uterus was exteriorized and cleaned of all clots and debris. The uterine incision was closed with running locked sutures of 0 Vicryl. Hemostasis was observed. The uterus was reintroduced into the abdominal cavity. Bilateral abdominal gutters were cleared of all clots and debris. Bilateral tubes and ovaries were visualized and appeared normal. The hysterotomy was again inspected and found to be hemostatic. Rectus muscles were inspected and found to be hemostatic. The fascia was then reapproximated with running sutures of 0 Vicryl. The subcuticular space was irrigated copiously.  The subcuticular space was closed using a 2-0 plain gut suture in a running fashion. The skin was reapproximated with insorb staples. The skin was then cleansed and dressed with a silver dressing in sterile fashion. Instrument, sponge, and needle counts were correct prior the abdominal closure and at the conclusion of the case. The urine remained clear throughout the case. Ancef and azithromycin were given for antibiotic prophylaxis. SCDs for DVT prophylaxis remain in place for the post operative period. Dr. Moe Elias was present for the entire operation. Findings:  Viable 8 lb 6 oz male infant in cephalic presentation with Apgars of 8 at 1 minute and 9 at five minutes, normal appearing uterus tubes and ovaries   Quantitative Blood Loss: 345ml  Total IV Fluids: 600ml  Urine output: 300ml clear urine   Drains:  oliva catheter  Specimens:  placenta sent to pathology, cord blood and cord gases  Instrument and Sponge Count: Correct  Complications: none  Condition: Infant stable, transfer to [facility], Mother stable, transfer to post anesthesia recovery      Alf Oreilly DO  OB/GYN Resident  4/17/2021, 4:31 PM    Date: 4/17/2021  Time: 5:47 PM    Attending Attestation:   I was present and scrubbed for the entire procedure.     Attending Name: Irene Coffman D.O.

## 2021-04-17 NOTE — FLOWSHEET NOTE
at bedside to discuss POC with patient and family. Patient agreeable to c/s at this time. Will proceed to get consent and prep for surgery.

## 2021-04-17 NOTE — FLOWSHEET NOTE
Patient transferred via LDRP bed to room 733 with infant in arms. Spouse accompanied patient with belongings. Bedside report given to [address] [place] [address]. Patient updated on current POC. All questions answered/addressed.

## 2021-04-17 NOTE — PROGRESS NOTES
Labor Progress Note    Gavin Tomlinson is a 31 y.o. female  at 40w1d  The patient was seen and examined. Her pain is not well controlled. She reports fetal movement is present, complains of contractions, complains of loss of fluid, denies vaginal bleeding. Patient with an occasional late deceleration which resolves with intrauterine resuscitation.        Vital Signs:  Vitals:    21 0032 21 0740 21 0800 21 0900   BP: (!) 134/59 115/60 (!) 104/55 118/64   Pulse: 77 79 80 77   Resp:  16   Temp: 97.9 °F (36.6 °C)  98.4 °F (36.9 °C)    TempSrc: Oral  Oral    Weight:       Height:            FHT: 120, moderate variability, accelerations present, rare late deceleration overnight which resolves with intrauterine resuscitation  Contractions: irregular, every 2-5 minutes    Chaperone for Intimate Exam: Chaperone was present for entire exam, Chaperone Name: Magda Arias RN  Cervical Exam: 1 cm dilated, 50 effaced, -3 station  Pitocin: @ 10 mu/min    Membranes: Ruptured clear fluid  Scalp Electrode in place: absent  Intrauterine Pressure Catheter in Place: absent    Interventions: SVE performed    Assessment/Plan:  Gavin Tomlinson is a 31 y.o. female  at 40w1d admitted for IOL 2/2 late term   - GBS positive, Pen G for GBS prophylaxis   - VSS, afebrile   - cEFM/TOCO   - S/p cytotec 25mcg PV x2, cytotec 50mcg buccal x1   - S/p morphine/phenergan x1   - SROM (clr) @ on    - Plan for IUPC placement, continue pitocin per protocol   - Will order morphine/phenergan for pain    Attending updated and in agreement with plan    Maya Jackson, DO  Ob/Gyn Resident  2021, 9:30 AM

## 2021-04-17 NOTE — PROGRESS NOTES
Labor Progress Note    Alfonso Frias is a 31 y.o. female  at 37w0d  The patient was seen and examined. Her pain is well controlled. She reports fetal movement is present, complains of contractions, complains of loss of fluid, denies vaginal bleeding. Patient called out complaining of a big gush of fluid. Clear fluid noted on bedding. Nitrizine positive.     Vital Signs:  Vitals:    21 1447 21 1527 21 1648 21 1926   BP: (!) 115/59 113/85 (!) 101/57 (!) 113/57   Pulse: 90 88 84 83   Resp: 18 16  16   Temp:  98.1 °F (36.7 °C)     TempSrc:  Oral     Weight:       Height:             FHT: 125, moderate variability, accelerations present, decelerations absent  Contractions: regular, every 2-3 minutes    Chaperone for Intimate Exam: Chaperone was present for entire exam, Chaperone Name: Jonn Hutton RN  Cervical Exam: FT cm dilated, thick effaced, -3 station  Pitocin: @ 0 mu/min    Membranes: Ruptured clear fluid  Scalp Electrode in place: absent  Intrauterine Pressure Catheter in Place: absent    Interventions: none    Assessment/Plan:  Alfonso Frias is a 31 y.o. female  at 41w0d admitted for IOL 2/2 Late term pregnancy   - GBS positive, Pen G for GBS prophylaxis   - VSS, afebrile   - cEFM/TOCO- Cat 1, regular contractions   - SROM Clr at    - S/p Cytoec 25 PV x2   - Will plan for one dose of buccal cytotec followed by pitocin   - Continue to monitor        Attending updated and in agreement with plan    Renaldo Soto DO  Ob/Gyn Resident  2021, 8:49 PM

## 2021-04-17 NOTE — PROGRESS NOTES
Labor Progress Note    Jordan Harris is a 31 y.o. female  at 37w0d  The patient was seen and examined. Her pain is well controlled. She reports fetal movement is present, complains of contractions, complains of loss of fluid, denies vaginal bleeding. Patient complains of being hungry and is requesting something to eat.     Vital Signs:  Vitals:    21 1447 21 1527 21 1648 21 1926   BP: (!) 115/59 113/85 (!) 101/57 (!) 113/57   Pulse: 90 88 84 83   Resp: 18 16  16   Temp:  98.1 °F (36.7 °C)     TempSrc:  Oral     Weight:       Height:             FHT: 125, moderate variability, accelerations present, decelerations absent  Contractions: regular, every 3-4 minutes    Chaperone for Intimate Exam: Chaperone was present for entire exam, Chaperone Name: Shaunna Muro RN  Cervical Exam: 1 cm dilated, thick effaced, -3 station  Pitocin: @ 0 mu/min    Membranes: Ruptured clear fluid  Scalp Electrode in place: absent  Intrauterine Pressure Catheter in Place: absent    Interventions: none    Assessment/Plan:  Jordan Harris is a 31 y.o. female  at 41w0d admitted for IOL 2/2 Late term pregnancy   - GBS positive, Pen G for GBS prophylaxis   - VSS, afebrile   - cEFM/TOCO- Cat 1, regular contractions   - SROM Clr at    - Will start pitocin at 0100   - S/p Cytoec 25 PV x2   - S/p Cytotec 50 PC   - Continue to monitor        Attending updated and in agreement with plan    Georges Andrade DO  Ob/Gyn Resident  2021, 12:24 AM

## 2021-04-18 PROBLEM — O13.9 GESTATIONAL HYPERTENSION: Status: ACTIVE | Noted: 2021-04-18

## 2021-04-18 LAB
ABSOLUTE EOS #: <0.03 K/UL (ref 0–0.44)
ABSOLUTE IMMATURE GRANULOCYTE: 0.18 K/UL (ref 0–0.3)
ABSOLUTE LYMPH #: 1.62 K/UL (ref 1.1–3.7)
ABSOLUTE MONO #: 0.9 K/UL (ref 0.1–1.2)
ALBUMIN SERPL-MCNC: 2.6 G/DL (ref 3.5–5.2)
ALBUMIN/GLOBULIN RATIO: 1 (ref 1–2.5)
ALP BLD-CCNC: 117 U/L (ref 35–104)
ALT SERPL-CCNC: 9 U/L (ref 5–33)
ANION GAP SERPL CALCULATED.3IONS-SCNC: 8 MMOL/L (ref 9–17)
AST SERPL-CCNC: 17 U/L
BASOPHILS # BLD: 0 % (ref 0–2)
BASOPHILS ABSOLUTE: <0.03 K/UL (ref 0–0.2)
BILIRUB SERPL-MCNC: <0.1 MG/DL (ref 0.3–1.2)
BUN BLDV-MCNC: 9 MG/DL (ref 6–20)
BUN/CREAT BLD: ABNORMAL (ref 9–20)
CALCIUM SERPL-MCNC: 8.6 MG/DL (ref 8.6–10.4)
CHLORIDE BLD-SCNC: 104 MMOL/L (ref 98–107)
CO2: 22 MMOL/L (ref 20–31)
CREAT SERPL-MCNC: 0.4 MG/DL (ref 0.5–0.9)
DIFFERENTIAL TYPE: ABNORMAL
EOSINOPHILS RELATIVE PERCENT: 0 % (ref 1–4)
GFR AFRICAN AMERICAN: >60 ML/MIN
GFR NON-AFRICAN AMERICAN: >60 ML/MIN
GFR SERPL CREATININE-BSD FRML MDRD: ABNORMAL ML/MIN/{1.73_M2}
GFR SERPL CREATININE-BSD FRML MDRD: ABNORMAL ML/MIN/{1.73_M2}
GLUCOSE BLD-MCNC: 122 MG/DL (ref 70–99)
HCT VFR BLD CALC: 27.7 % (ref 36.3–47.1)
HEMOGLOBIN: 8.7 G/DL (ref 11.9–15.1)
IMMATURE GRANULOCYTES: 1 %
LYMPHOCYTES # BLD: 11 % (ref 24–43)
MCH RBC QN AUTO: 28 PG (ref 25.2–33.5)
MCHC RBC AUTO-ENTMCNC: 31.4 G/DL (ref 28.4–34.8)
MCV RBC AUTO: 89.1 FL (ref 82.6–102.9)
MONOCYTES # BLD: 6 % (ref 3–12)
NRBC AUTOMATED: 0 PER 100 WBC
PDW BLD-RTO: 13.4 % (ref 11.8–14.4)
PLATELET # BLD: 208 K/UL (ref 138–453)
PLATELET ESTIMATE: ABNORMAL
PMV BLD AUTO: 11.2 FL (ref 8.1–13.5)
POTASSIUM SERPL-SCNC: 3.9 MMOL/L (ref 3.7–5.3)
RBC # BLD: 3.11 M/UL (ref 3.95–5.11)
RBC # BLD: ABNORMAL 10*6/UL
SEG NEUTROPHILS: 82 % (ref 36–65)
SEGMENTED NEUTROPHILS ABSOLUTE COUNT: 12.53 K/UL (ref 1.5–8.1)
SODIUM BLD-SCNC: 134 MMOL/L (ref 135–144)
TOTAL PROTEIN: 5.3 G/DL (ref 6.4–8.3)
WBC # BLD: 15.3 K/UL (ref 3.5–11.3)
WBC # BLD: ABNORMAL 10*3/UL

## 2021-04-18 PROCEDURE — 1220000000 HC SEMI PRIVATE OB R&B

## 2021-04-18 PROCEDURE — 2580000003 HC RX 258: Performed by: STUDENT IN AN ORGANIZED HEALTH CARE EDUCATION/TRAINING PROGRAM

## 2021-04-18 PROCEDURE — 85025 COMPLETE CBC W/AUTO DIFF WBC: CPT

## 2021-04-18 PROCEDURE — 36415 COLL VENOUS BLD VENIPUNCTURE: CPT

## 2021-04-18 PROCEDURE — 80053 COMPREHEN METABOLIC PANEL: CPT

## 2021-04-18 PROCEDURE — 6360000002 HC RX W HCPCS: Performed by: STUDENT IN AN ORGANIZED HEALTH CARE EDUCATION/TRAINING PROGRAM

## 2021-04-18 PROCEDURE — 6370000000 HC RX 637 (ALT 250 FOR IP): Performed by: STUDENT IN AN ORGANIZED HEALTH CARE EDUCATION/TRAINING PROGRAM

## 2021-04-18 RX ADMIN — KETOROLAC TROMETHAMINE 30 MG: 30 INJECTION, SOLUTION INTRAMUSCULAR at 00:08

## 2021-04-18 RX ADMIN — DOCUSATE SODIUM 100 MG: 100 CAPSULE, LIQUID FILLED ORAL at 08:01

## 2021-04-18 RX ADMIN — HYDROCODONE BITARTRATE AND ACETAMINOPHEN 1 TABLET: 5; 325 TABLET ORAL at 20:44

## 2021-04-18 RX ADMIN — DOCUSATE SODIUM 100 MG: 100 CAPSULE, LIQUID FILLED ORAL at 00:14

## 2021-04-18 RX ADMIN — KETOROLAC TROMETHAMINE 30 MG: 30 INJECTION, SOLUTION INTRAMUSCULAR at 05:59

## 2021-04-18 RX ADMIN — IBUPROFEN 600 MG: 600 TABLET, FILM COATED ORAL at 11:58

## 2021-04-18 RX ADMIN — Medication 1 TABLET: at 08:01

## 2021-04-18 RX ADMIN — IRON SUCROSE 200 MG: 20 INJECTION, SOLUTION INTRAVENOUS at 23:37

## 2021-04-18 RX ADMIN — HYDROCODONE BITARTRATE AND ACETAMINOPHEN 1 TABLET: 5; 325 TABLET ORAL at 12:10

## 2021-04-18 RX ADMIN — DOCUSATE SODIUM 100 MG: 100 CAPSULE, LIQUID FILLED ORAL at 20:41

## 2021-04-18 RX ADMIN — SODIUM CHLORIDE 25 ML: 9 INJECTION, SOLUTION INTRAVENOUS at 23:38

## 2021-04-18 RX ADMIN — IBUPROFEN 600 MG: 600 TABLET, FILM COATED ORAL at 23:43

## 2021-04-18 RX ADMIN — IBUPROFEN 600 MG: 600 TABLET, FILM COATED ORAL at 18:03

## 2021-04-18 ASSESSMENT — PAIN SCALES - GENERAL
PAINLEVEL_OUTOF10: 4
PAINLEVEL_OUTOF10: 4
PAINLEVEL_OUTOF10: 6

## 2021-04-18 NOTE — PLAN OF CARE
Problem: Anxiety:  Goal: Level of anxiety will decrease  Description: Level of anxiety will decrease  4/18/2021 1925 by Pattie Arambula RN  Outcome: Completed  4/18/2021 0653 by Rehan Romero RN  Outcome: Ongoing     Problem: Breathing Pattern - Ineffective:  Goal: Able to breathe comfortably  Description: Able to breathe comfortably  4/18/2021 1925 by Pattie Arambula RN  Outcome: Completed  4/18/2021 0653 by Rehan Romero RN  Outcome: Ongoing     Problem: Fluid Volume - Imbalance:  Goal: Absence of imbalanced fluid volume signs and symptoms  Description: Absence of imbalanced fluid volume signs and symptoms  4/18/2021 1925 by Pattie Arambula RN  Outcome: Ongoing  4/18/2021 0653 by Rehan Romero RN  Outcome: Ongoing  Goal: Absence of intrapartum hemorrhage signs and symptoms  Description: Absence of intrapartum hemorrhage signs and symptoms  4/18/2021 1925 by Pattie Arambula RN  Outcome: Ongoing  4/18/2021 0653 by Rehan Romero RN  Outcome: Ongoing  Goal: Absence of postpartum hemorrhage signs and symptoms  Description: Absence of postpartum hemorrhage signs and symptoms  4/18/2021 1925 by Pattie Arambula RN  Outcome: Ongoing  4/18/2021 0653 by Rehan Romero RN  Outcome: Ongoing     Problem: Infection - Intrapartum Infection:  Goal: Will show no infection signs and symptoms  Description: Will show no infection signs and symptoms  4/18/2021 1925 by Pattie Arambula RN  Outcome: Ongoing  4/18/2021 0653 by Rehan Romero RN  Outcome: Ongoing     Problem: Labor Process - Prolonged:  Goal: Labor progression, first stage, within specified pattern  Description: Labor progression, first stage, within specified pattern  4/18/2021 1925 by Pattie Arambula RN  Outcome: Completed  4/18/2021 0653 by Rehan Romero RN  Outcome: Ongoing  Goal: Labor progession, second stage, within specified pattern  Description: Labor progession, second stage, within specified pattern  4/18/2021 1925 by Tristan Rodriguez Sofia Hoyos RN  Outcome: Completed  2021 0653 by Kathy Terrell RN  Outcome: Ongoing  Goal: Uterine contractions within specified parameters  Description: Uterine contractions within specified parameters  2021 by Iris Quintanilla RN  Outcome: Completed  2021 by Kathy Terrell RN  Outcome: Ongoing     Problem:  Screening:  Goal: Ability to make informed decisions regarding treatment has improved  Description: Ability to make informed decisions regarding treatment has improved  2021 by Iris Quintanilla RN  Outcome: Completed  2021 by Kathy Terrell RN  Outcome: Ongoing     Problem: Pain - Acute:  Goal: Pain level will decrease  Description: Pain level will decrease  2021 by Iris Quintanilla RN  Outcome: Ongoing  2021 by Kathy Terrell RN  Outcome: Ongoing  Goal: Able to cope with pain  Description: Able to cope with pain  2021 by Iris Quintanilla RN  Outcome: Ongoing  2021 by Kathy Terrell RN  Outcome: Ongoing     Problem: Tissue Perfusion - Uteroplacental, Altered:  Goal: Absence of abnormal fetal heart rate pattern  Description: Absence of abnormal fetal heart rate pattern  2021 by Iris Quintanilla RN  Outcome: Completed  2021 by Kathy Terrell RN  Outcome: Ongoing     Problem: Urinary Retention:  Goal: Experiences of bladder distention will decrease  Description: Experiences of bladder distention will decrease  2021 by Iris Quintanilla RN  Outcome: Ongoing  [id number] by Kathy Terrell RN  Outcome: Ongoing  Goal: Urinary elimination within specified parameters  Description: Urinary elimination within specified parameters  2021 by Iris Quintanilla RN  Outcome: Ongoing  2021 by Kathy Terrell RN  Outcome: Ongoing     Problem: Discharge Planning:  Goal: Discharged to appropriate level of care  Description: Discharged to appropriate level of care  4/18/2021 1925 by Ayleen Márquez RN  Outcome: Ongoing  4/18/2021 0653 by Zabrina Valencia RN  Outcome: Ongoing     Problem: Constipation:  Goal: Bowel elimination is within specified parameters  Description: Bowel elimination is within specified parameters  4/18/2021 1925 by Ayleen Márquez RN  Outcome: Ongoing  4/18/2021 0653 by Zabrina Valencai RN  Outcome: Ongoing     Problem: Mood - Altered:  Goal: Mood stable  Description: Mood stable  4/18/2021 1925 by Ayleen Márquez RN  Outcome: Ongoing  4/18/2021 0653 by Zabrina Valencia RN  Outcome: Ongoing     Problem: Infection - Surgical Site:  Goal: Will show no infection signs and symptoms  Description: Will show no infection signs and symptoms  4/18/2021 1925 by Ayleen Márquez RN  Outcome: Ongoing  4/18/2021 0653 by Zabrina Valencia RN  Outcome: Ongoing     Problem: Nausea/Vomiting:  Goal: Absence of nausea/vomiting  Description: Absence of nausea/vomiting  Outcome: Ongoing     Problem: Venous Thromboembolism:  Goal: Will show no signs or symptoms of venous thromboembolism  Description: Will show no signs or symptoms of venous thromboembolism  Outcome: Ongoing  Goal: Absence of signs or symptoms of impaired coagulation  Description: Absence of signs or symptoms of impaired coagulation  Outcome: Ongoing     Problem: Pain:  Goal: Pain level will decrease  Description: Pain level will decrease  4/18/2021 1925 by Ayleen Márquez RN  Outcome: Ongoing  4/18/2021 0653 by Zabrina Valencia RN  Outcome: Ongoing  Goal: Control of acute pain  Description: Control of acute pain  Outcome: Ongoing  Goal: Control of chronic pain  Description: Control of chronic pain  Outcome: Ongoing

## 2021-04-18 NOTE — PROGRESS NOTES
OB/GYN Progress Note  Morning labs reviewed HG down to 8.7 from 10.6. CMP wnl.     Penny Whitaker DO, PGY-1  Ob/Gyn Resident  [address]  04/18/21

## 2021-04-18 NOTE — CARE COORDINATION
POST-PARTUM INITIAL DISCHARGE PLANNING/CARE COORDINATION    High risk pregnancy, antepartum [O09.90]    Writer met w/ Flavia Miranda at bedside to discuss DCP. Flavia Miranda is S/P CS on 4/17/2021    Infant name on BC: Ciarra Diaz. Infant to [facility]. Infant PCP undecided. List given. FOB: [place] [facility]ide[employer] verified name/address/phone number correct on facesheet    MMO insurance correct. Writer notified patient she has 30 days from date of birth to add baby to insurance policy. Dad verbalized understanding and will call HR/[employer]. Mom and dad verbalized has/have all necessary items for [facility] (WOMEN). No previous home care or dme. No Home Care or DME anticipated. Anticipate DC of couplet 4/21/2021    CM continue to follow for any DC needs.     OB:  Dr Sukhjinder Cloud
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
## 2021-04-19 VITALS
SYSTOLIC BLOOD PRESSURE: 107 MMHG | HEART RATE: 69 BPM | RESPIRATION RATE: 20 BRPM | BODY MASS INDEX: 34.37 KG/M2 | TEMPERATURE: 98.2 F | HEIGHT: 67 IN | OXYGEN SATURATION: 98 % | DIASTOLIC BLOOD PRESSURE: 69 MMHG | WEIGHT: 219 LBS

## 2021-04-19 PROCEDURE — 6370000000 HC RX 637 (ALT 250 FOR IP): Performed by: STUDENT IN AN ORGANIZED HEALTH CARE EDUCATION/TRAINING PROGRAM

## 2021-04-19 RX ADMIN — DOCUSATE SODIUM 100 MG: 100 CAPSULE, LIQUID FILLED ORAL at 08:32

## 2021-04-19 RX ADMIN — IBUPROFEN 600 MG: 600 TABLET, FILM COATED ORAL at 11:45

## 2021-04-19 RX ADMIN — IBUPROFEN 600 MG: 600 TABLET, FILM COATED ORAL at 06:07

## 2021-04-19 RX ADMIN — HYDROCODONE BITARTRATE AND ACETAMINOPHEN 1 TABLET: 5; 325 TABLET ORAL at 06:07

## 2021-04-19 RX ADMIN — HYDROCODONE BITARTRATE AND ACETAMINOPHEN 1 TABLET: 5; 325 TABLET ORAL at 11:44

## 2021-04-19 ASSESSMENT — PAIN SCALES - GENERAL
PAINLEVEL_OUTOF10: 5
PAINLEVEL_OUTOF10: 4

## 2021-04-19 NOTE — FLOWSHEET NOTE
I have reviewed all [facility] Post-Birth Warning Signs and essential teaching points for pulmonary embolism, cardiac disease, hypertensive disorders of pregnancy, obstetric hemorrhage, venous thromboembolism, infection, and postpartum depression with the patient and FOBID bands checked. Discharge teaching complete, discharge instructions signed, & parent/guardian denies questions regarding infant care at time of discharge. Parents  verbalized understanding to follow-up with the pediatrician or family physician as  recommended on the discharge instructions. Mother verbalizes understanding to follow-up with babys care provider as instructed. Discharged in stable  condition to care of parents. Infant placed in rear facing car seat per parents. (support person) . I have informed the patient on when to call their healthcare provider and when to call 911. I have discussed with the patient  the importance of scheduling a follow-up visit with their physician, nurse practitioner or midwife and provided them with correct contact information for appointment. I have provided the patient with a copy of the \"Save Your Life\" handout. The patient has acknowledged receiving and understanding this education with her signature.

## 2021-04-19 NOTE — PLAN OF CARE
Problem: Fluid Volume - Imbalance:  Goal: Absence of imbalanced fluid volume signs and symptoms  Description: Absence of imbalanced fluid volume signs and symptoms  Outcome: Completed  Goal: Absence of intrapartum hemorrhage signs and symptoms  Description: Absence of intrapartum hemorrhage signs and symptoms  Outcome: Completed  Goal: Absence of postpartum hemorrhage signs and symptoms  Description: Absence of postpartum hemorrhage signs and symptoms  Outcome: Completed     Problem: Infection - Intrapartum Infection:  Goal: Will show no infection signs and symptoms  Description: Will show no infection signs and symptoms  Outcome: Completed     Problem: Pain - Acute:  Goal: Pain level will decrease  Description: Pain level will decrease  Outcome: Completed  Goal: Able to cope with pain  Description: Able to cope with pain  Outcome: Completed     Problem: Urinary Retention:  Goal: Experiences of bladder distention will decrease  Description: Experiences of bladder distention will decrease  Outcome: Completed  Goal: Urinary elimination within specified parameters  Description: Urinary elimination within specified parameters  Outcome: Completed     Problem: Discharge Planning:  Goal: Discharged to appropriate level of care  Description: Discharged to appropriate level of care  Outcome: Completed     Problem: Constipation:  Goal: Bowel elimination is within specified parameters  Description: Bowel elimination is within specified parameters  Outcome: Completed     Problem: Mood - Altered:  Goal: Mood stable  Description: Mood stable  Outcome: Completed     Problem: Infection - Surgical Site:  Goal: Will show no infection signs and symptoms  Description: Will show no infection signs and symptoms  Outcome: Completed     Problem: Nausea/Vomiting:  Goal: Absence of nausea/vomiting  Description: Absence of nausea/vomiting  Outcome: Completed     Problem: Venous Thromboembolism:  Goal: Will show no signs or symptoms of venous thromboembolism  Description: Will show no signs or symptoms of venous thromboembolism  Outcome: Completed  Goal: Absence of signs or symptoms of impaired coagulation  Description: Absence of signs or symptoms of impaired coagulation  Outcome: Completed     Problem: Pain:  Goal: Pain level will decrease  Description: Pain level will decrease  Outcome: Completed  Goal: Control of acute pain  Description: Control of acute pain  Outcome: Completed  Goal: Control of chronic pain  Description: Control of chronic pain  Outcome: Completed

## 2021-04-19 NOTE — CARE COORDINATION
Social Work     Sw reviewed medical record (current active problem list) and tox screens and found no concerns. Sw spoke with mom briefly to explain Sw role, inquire if any needs or concerns, and provide safe sleep education and discuss. Mom denied any needs or questions and informs baby has a safe sleep environment (bassinet and crib). Mom reports this is her and 's 1st child and reports they have a good support system (dad off work to help, when he returns her sister will be helping). Mom denied any current questions or needs and reports ped will be [employer]. Sw encouraged mom to reach out if any issues or concerns arise.

## 2021-04-19 NOTE — PROGRESS NOTES
OB/GYN Resident Interval Note    Was informed by RN that patient's IV infiltrated and she was refusing for a new IV to be placed. Last dose of Venofer will not be able to be administered this evening since patient is refusing her IV to be replaced.        Mara Chavez DO   OB/GYN Resident  Pager [phone]  [facility]  4/18/2021, 11:06 PM

## 2021-04-19 NOTE — PROGRESS NOTES
CLINICAL PHARMACY NOTE: MEDS TO [address] Select Patient?: No  Total # of Prescriptions Filled: 4   The following medications were delivered to the patient:  · Hydrocodone - acetaminophen 5 /325 mg tab  · Ferrous sulfate 325 mg tab  · Ibuprofen 600 mg tab  · Docusate sodium 100 mg caps  Total # of Interventions Completed: 1  Time Spent (min): 60    Additional Documentation:Medications delivered to the patient in her room (733).
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
## 2021-04-20 LAB — SURGICAL PATHOLOGY REPORT: NORMAL

## 2021-04-23 ENCOUNTER — POSTPARTUM VISIT (OUTPATIENT)
Dept: OBGYN CLINIC | Age: 31
End: 2021-04-23

## 2021-04-23 VITALS
WEIGHT: 207 LBS | BODY MASS INDEX: 32.49 KG/M2 | SYSTOLIC BLOOD PRESSURE: 126 MMHG | HEIGHT: 67 IN | DIASTOLIC BLOOD PRESSURE: 78 MMHG

## 2021-04-23 DIAGNOSIS — Z98.891 STATUS POST CESAREAN SECTION ROUTINE POSTPARTUM FOLLOW-UP: Primary | ICD-10-CM

## 2021-04-23 PROCEDURE — 99024 POSTOP FOLLOW-UP VISIT: CPT | Performed by: STUDENT IN AN ORGANIZED HEALTH CARE EDUCATION/TRAINING PROGRAM
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
## 2021-05-21 ENCOUNTER — POSTPARTUM VISIT (OUTPATIENT)
Dept: OBGYN CLINIC | Age: 31
End: 2021-05-21
Payer: COMMERCIAL

## 2021-05-21 VITALS
SYSTOLIC BLOOD PRESSURE: 114 MMHG | HEIGHT: 67 IN | BODY MASS INDEX: 29.98 KG/M2 | DIASTOLIC BLOOD PRESSURE: 68 MMHG | WEIGHT: 191 LBS

## 2021-05-21 DIAGNOSIS — Z30.430 ENCOUNTER FOR IUD INSERTION: ICD-10-CM

## 2021-05-21 PROCEDURE — 58300 INSERT INTRAUTERINE DEVICE: CPT | Performed by: STUDENT IN AN ORGANIZED HEALTH CARE EDUCATION/TRAINING PROGRAM
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
## 2021-05-21 NOTE — Clinical Note
Prashant Sotelo,    I did a Postpartum Appt and IUD insertion on the same visit.     Thanks,    Kenya Camacho 0027 Ob/Gyn   5/21/2021, 8:59 AM

## 2021-07-12 PROBLEM — Z97.5 IUD (INTRAUTERINE DEVICE) IN PLACE: Status: ACTIVE | Noted: 2021-07-12

## 2022-05-05 ENCOUNTER — HOSPITAL ENCOUNTER (OUTPATIENT)
Age: 32
Setting detail: SPECIMEN
Discharge: HOME | End: 2022-05-05

## 2022-05-05 ENCOUNTER — OFFICE VISIT (OUTPATIENT)
Dept: OBGYN CLINIC | Age: 32
End: 2022-05-05
Payer: COMMERCIAL

## 2022-05-05 VITALS
BODY MASS INDEX: 29.82 KG/M2 | HEIGHT: 67 IN | DIASTOLIC BLOOD PRESSURE: 62 MMHG | SYSTOLIC BLOOD PRESSURE: 112 MMHG | WEIGHT: 190 LBS

## 2022-05-05 DIAGNOSIS — F41.9 ANXIETY: ICD-10-CM

## 2022-05-05 DIAGNOSIS — Z01.419 ENCOUNTER FOR GYNECOLOGICAL EXAMINATION: Primary | ICD-10-CM

## 2022-05-05 DIAGNOSIS — R35.0 URINARY FREQUENCY: ICD-10-CM

## 2022-05-05 DIAGNOSIS — Z76.89 ENCOUNTER TO ESTABLISH CARE: ICD-10-CM

## 2022-05-05 DIAGNOSIS — R39.15 URINARY URGENCY: ICD-10-CM

## 2022-05-05 DIAGNOSIS — N39.41 URGE INCONTINENCE OF URINE: ICD-10-CM

## 2022-05-05 PROCEDURE — 99395 PREV VISIT EST AGE 18-39: CPT
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
## 2022-05-06 DIAGNOSIS — R35.0 URINARY FREQUENCY: ICD-10-CM

## 2022-05-06 LAB
BILIRUBIN URINE: NEGATIVE
COLOR: YELLOW
COMMENT UA: NORMAL
GLUCOSE URINE: NEGATIVE
KETONES, URINE: NEGATIVE
LEUKOCYTE ESTERASE, URINE: NEGATIVE
NITRITE, URINE: NEGATIVE
PH UA: 6.5 (ref 5–8)
PROTEIN UA: NEGATIVE
SPECIFIC GRAVITY UA: 1.01 (ref 1–1.03)
TURBIDITY: CLEAR
URINE HGB: NEGATIVE
UROBILINOGEN, URINE: NORMAL

## 2022-05-07 LAB
CULTURE: NORMAL
SPECIMEN DESCRIPTION: NORMAL

## 2022-05-10 DIAGNOSIS — N39.41 URGE INCONTINENCE OF URINE: ICD-10-CM

## 2022-05-10 DIAGNOSIS — R39.15 URINARY URGENCY: Primary | ICD-10-CM

## 2022-05-18 ENCOUNTER — OFFICE VISIT (OUTPATIENT)
Dept: BEHAVIORAL/MENTAL HEALTH CLINIC | Age: 32
End: 2022-05-18
Payer: COMMERCIAL

## 2022-05-18 DIAGNOSIS — F41.1 GENERALIZED ANXIETY DISORDER: Primary | ICD-10-CM

## 2022-05-18 PROCEDURE — 90791 PSYCH DIAGNOSTIC EVALUATION: CPT | Performed by: COUNSELOR

## 2022-05-18 ASSESSMENT — PATIENT HEALTH QUESTIONNAIRE - PHQ9
SUM OF ALL RESPONSES TO PHQ9 QUESTIONS 1 & 2: 2
SUM OF ALL RESPONSES TO PHQ QUESTIONS 1-9: 2
2. FEELING DOWN, DEPRESSED OR HOPELESS: 1
SUM OF ALL RESPONSES TO PHQ QUESTIONS 1-9: 2
1. LITTLE INTEREST OR PLEASURE IN DOING THINGS: 1

## 2022-05-18 ASSESSMENT — ANXIETY QUESTIONNAIRES
7. FEELING AFRAID AS IF SOMETHING AWFUL MIGHT HAPPEN: 0
1. FEELING NERVOUS, ANXIOUS, OR ON EDGE: 1
6. BECOMING EASILY ANNOYED OR IRRITABLE: 1
3. WORRYING TOO MUCH ABOUT DIFFERENT THINGS: 3
2. NOT BEING ABLE TO STOP OR CONTROL WORRYING: 2
GAD7 TOTAL SCORE: 11
IF YOU CHECKED OFF ANY PROBLEMS ON THIS QUESTIONNAIRE, HOW DIFFICULT HAVE THESE PROBLEMS MADE IT FOR YOU TO DO YOUR WORK, TAKE CARE OF THINGS AT HOME, OR GET ALONG WITH OTHER PEOPLE: SOMEWHAT DIFFICULT
5. BEING SO RESTLESS THAT IT IS HARD TO SIT STILL: 2
4. TROUBLE RELAXING: 2

## 2022-05-18 NOTE — PROGRESS NOTES
ADULT BEHAVIORAL HEALTH       Visit Date: 5/18/2022   Time of appointment:  10:00 am   Time spent with Patient: 55 minutes. This is patient's Initial appointment. Reason for Consult:    Chief Complaint   Patient presents with    Anxiety     post partum    Depression     post partum       Referring Provider/PCP:    No ref. provider found  No primary care provider on file. Pt provided informed consent for the behavioral health program. Discussed with patient model of service to include the limits of confidentiality (i.e. abuse reporting, suicide intervention, etc.) and short-term intervention focused approach. Pt indicated understanding. PRESENTING PROBLEM AND HISTORY  Laurie Francisco is a 34 y.o. female who presents for new evaluation and treatment of post partum anxiety and depression including feeling \"overwhelmed\" and stressor of being a new mother, previous miscarriage, childcare issues, and father has substance abuse challenges. anxiety, depression. She has the following symptoms: depressed mood, excessive crying, low self-esteem, isolating self, feelings of worthlessness, suicidal thoughts without plan, feeling nervous, anxious, or on edge, excessive worry about a number of events or activities , inability to stop or control worry, feeling fidgety or restless, history of trauma and bad dreams, miscarriage, lacking support from  with household chores. . She noted that SI without a plan happened once about 4-6 weeks ago and does not want to hurt self or baby. Laurie Francisco was upset that she had passive SI. She has future orientation and identifies she is willing to seek help should these thoughts happen again. Onset of symptoms was approximately 6 months ago. Symptoms have been gradually worsening since that time. She denies current suicidal and homicidal ideation. Family history significant for anxiety, depression, substance abuse and No ADHD, OCD, Psychosis. .  Risk factors: positive family history in  parents. Previous treatment includes none. She complains of the following medication side effects: none. MENTAL STATUS EXAM  Mood was euthymic with anxious and tearful affect. Suicidal ideation was denied. Homicidal ideation was denied. Hygiene was good . Dress was appropriate. Behavior was Within Normal Limits with No observation or self-report of difficulties ambulating. Attitude was Cooperative, Friendly and Help-seeking. Eye-contact was good. Speech: rate - WNL, rhythm -  WNL, volume - WNL  Verbalizations were goal directed and coherent. Thought processes were intact and goal-oriented without evidence of delusions, hallucinations, obsessions, or beronica; with little cognitive distortions. Associations were characterized by intact cognitive processes. Pt was oriented to person, place, time, and general circumstances;  recent:  good and remote:  good. Insight and judgment were estimated to be good, AEB, a good  understanding of cyclical maladaptive patterns, and the ability to use insight to inform behavior change. CURRENT MEDICATIONS    Current Outpatient Medications:     ferrous sulfate (FE TABS) 325 (65 Fe) MG EC tablet, Take 1 tablet by mouth 2 times daily (Patient not taking: Reported on 5/5/2022), Disp: 60 tablet, Rfl: 3    ibuprofen (ADVIL;MOTRIN) 600 MG tablet, Take 1 tablet by mouth every 6 hours as needed for Pain (Patient not taking: Reported on 5/5/2022), Disp: 120 tablet, Rfl: 0    docusate sodium (COLACE) 100 MG capsule, Take 1 capsule by mouth 2 times daily as needed for Constipation (Patient not taking: Reported on 5/5/2022), Disp: 60 capsule, Rfl: 1    Prenatal MV & Min w/FA-DHA (PRENATAL ADULT GUMMY/DHA/FA PO), Take by mouth (Patient not taking: Reported on 5/5/2022), Disp: , Rfl:      FAMILY MEDICAL/MH HISTORY   Her family history includes Heart Surgery in her maternal grandmother; Pacemaker in her paternal grandfather.     [address] [place] HISTORY  No history of outpatient counseling  No history of psychiatric hospitalizations    Denied history of firearms/weapons in the home. PSYCHOSOCIAL HISTORY  Current living situation: Agustín Lopez resides with her  and her one year old son Robin Ellsworth and one dog Haja in a house. Work/Education: Bachelor's degree; Work full-time as a  in food supply. Enjoys her job with some stressors    Support system: Mom, sister, best friend out of town. Latter-day/Spirituality: [employer]. [employer]. Leisure Activities:  Walking, bike ride, enjoys animals, being outdoors. DRUG AND ALCOHOL CURRENT USE/HISTORY  TOBACCO:  She reports that she has never smoked. She has never used smokeless tobacco.     ALCOHOL:  She reports previous alcohol use. OTHER SUBSTANCES: She reports no history of drug use. ASSESSMENT  Any Dietrich presented to the appointment today for evaluation and treatment of symptoms of    Diagnosis Orders   1. Generalized anxiety disorder          She is currently deemed no risk to herself or others and meets criteria for postpartum anxiety. Will monitor need for a medication evaluation to assess if medications could be helpful in treating anxiety/depressive symptoms although Agustín Lopez reports being \"anit\" medication. Tisha's anxiety/depressive symptoms are not well controlled at this time. She will also benefit from brief and solution-focused consultation to address cognitive and behavioral interventions for anxiety/depressive symptoms. Agustín Lopez was in agreement with recommendations.       PHQ Scores 5/18/2022   PHQ2 Score 2   PHQ9 Score 2     Interpretation of Total Score Depression Severity: 1-4 = Minimal depression, 5-9 = Mild depression, 10-14 = Moderate depression, 15-19 = Moderately severe depression, 20-27 = Severe depression    How often pt has had thoughts of death or hurting self (if PHQ positive for depression):       ANH 7 SCORE 5/18/2022   ANH-7 Total Score 11     Interpretation of ANH-7 score: 5-9 = mild anxiety, 10-14 = moderate anxiety, 15+ = severe anxiety. Recommend referral to behavioral health for scores 10 or greater. DIAGNOSIS/Plan  1. Generalized anxiety disorder       Return in about 2 weeks (around 6/1/2022). [facility] is agreeable to counseling focused on improving her mood and coping skills. INTERVENTION  Discussed various factors related to the development and maintenance of  depression and anxiety (including biological, cognitive, behavioral, and environmental factors), Discussed self-care (sleep, nutrition, rewarding activities, social support, exercise), Established rapport and Supportive techniques    INTERACTIVE COMPLEXITY  Is interactive complexity present?   No  Reason:  N/A  Additional Supporting Information:  N/A     Electronically signed by JEIR Khalil on 5/18/22 at 10:07 AM RUBIO

## 2022-05-18 NOTE — Clinical Note
Thank you for the referral.  Shailesh Lynch is agreeable to counseling. 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

## 2022-05-27 ENCOUNTER — OFFICE VISIT (OUTPATIENT)
Dept: FAMILY MEDICINE CLINIC | Age: 32
End: 2022-05-27
Payer: COMMERCIAL

## 2022-05-27 ENCOUNTER — HOSPITAL ENCOUNTER (OUTPATIENT)
Age: 32
Setting detail: SPECIMEN
Discharge: HOME | End: 2022-05-27

## 2022-05-27 VITALS
WEIGHT: 187 LBS | BODY MASS INDEX: 29.35 KG/M2 | HEIGHT: 67 IN | HEART RATE: 75 BPM | SYSTOLIC BLOOD PRESSURE: 106 MMHG | DIASTOLIC BLOOD PRESSURE: 69 MMHG

## 2022-05-27 DIAGNOSIS — Z76.89 ENCOUNTER TO ESTABLISH CARE: Primary | ICD-10-CM

## 2022-05-27 DIAGNOSIS — R42 EPISODIC LIGHTHEADEDNESS: ICD-10-CM

## 2022-05-27 DIAGNOSIS — Z76.89 ENCOUNTER TO ESTABLISH CARE: ICD-10-CM

## 2022-05-27 DIAGNOSIS — R53.83 LOW ENERGY: ICD-10-CM

## 2022-05-27 DIAGNOSIS — F41.9 ANXIETY: ICD-10-CM

## 2022-05-27 DIAGNOSIS — Z13.6 SCREENING FOR CARDIOVASCULAR CONDITION: ICD-10-CM

## 2022-05-27 LAB
ANION GAP SERPL CALCULATED.3IONS-SCNC: 10 MMOL/L (ref 9–17)
BUN BLDV-MCNC: 9 MG/DL (ref 6–20)
CALCIUM SERPL-MCNC: 9.4 MG/DL (ref 8.6–10.4)
CHLORIDE BLD-SCNC: 101 MMOL/L (ref 98–107)
CHOLESTEROL/HDL RATIO: 2.5
CHOLESTEROL: 152 MG/DL
CO2: 25 MMOL/L (ref 20–31)
CREAT SERPL-MCNC: 0.63 MG/DL (ref 0.5–0.9)
ESTIMATED AVERAGE GLUCOSE: 103 MG/DL
GFR AFRICAN AMERICAN: >60 ML/MIN
GFR NON-AFRICAN AMERICAN: >60 ML/MIN
GFR SERPL CREATININE-BSD FRML MDRD: NORMAL ML/MIN/{1.73_M2}
GLUCOSE BLD-MCNC: 80 MG/DL (ref 70–99)
HBA1C MFR BLD: 5.2 % (ref 4–6)
HCT VFR BLD CALC: 45.1 % (ref 36.3–47.1)
HDLC SERPL-MCNC: 62 MG/DL
HEMOGLOBIN: 15 G/DL (ref 11.9–15.1)
LDL CHOLESTEROL: 78 MG/DL (ref 0–130)
MCH RBC QN AUTO: 29.2 PG (ref 25.2–33.5)
MCHC RBC AUTO-ENTMCNC: 33.3 G/DL (ref 28.4–34.8)
MCV RBC AUTO: 87.9 FL (ref 82.6–102.9)
NRBC AUTOMATED: 0 PER 100 WBC
PDW BLD-RTO: 11.7 % (ref 11.8–14.4)
PLATELET # BLD: 256 K/UL (ref 138–453)
PMV BLD AUTO: 11.3 FL (ref 8.1–13.5)
POTASSIUM SERPL-SCNC: 4.1 MMOL/L (ref 3.7–5.3)
RBC # BLD: 5.13 M/UL (ref 3.95–5.11)
SODIUM BLD-SCNC: 136 MMOL/L (ref 135–144)
TRIGL SERPL-MCNC: 59 MG/DL
TSH SERPL DL<=0.05 MIU/L-ACNC: 0.85 UIU/ML (ref 0.3–5)
VITAMIN D 25-HYDROXY: 23.1 NG/ML
WBC # BLD: 4.9 K/UL (ref 3.5–11.3)

## 2022-05-27 PROCEDURE — 99203 OFFICE O/P NEW LOW 30 MIN: CPT | Performed by: NURSE PRACTITIONER

## 2022-05-27 SDOH — ECONOMIC STABILITY: INCOME INSECURITY: IN THE LAST 12 MONTHS, WAS THERE A TIME WHEN YOU WERE NOT ABLE TO PAY THE MORTGAGE OR RENT ON TIME?: NO

## 2022-05-27 SDOH — ECONOMIC STABILITY: TRANSPORTATION INSECURITY
IN THE PAST 12 MONTHS, HAS THE LACK OF TRANSPORTATION KEPT YOU FROM MEDICAL APPOINTMENTS OR FROM GETTING MEDICATIONS?: NO

## 2022-05-27 SDOH — ECONOMIC STABILITY: FOOD INSECURITY: WITHIN THE PAST 12 MONTHS, THE FOOD YOU BOUGHT JUST DIDN'T LAST AND YOU DIDN'T HAVE MONEY TO GET MORE.: NEVER TRUE

## 2022-05-27 SDOH — ECONOMIC STABILITY: TRANSPORTATION INSECURITY
IN THE PAST 12 MONTHS, HAS LACK OF TRANSPORTATION KEPT YOU FROM MEETINGS, WORK, OR FROM GETTING THINGS NEEDED FOR DAILY LIVING?: NO

## 2022-05-27 SDOH — ECONOMIC STABILITY: HOUSING INSECURITY
IN THE LAST 12 MONTHS, WAS THERE A TIME WHEN YOU DID NOT HAVE A STEADY PLACE TO SLEEP OR SLEPT IN A SHELTER (INCLUDING NOW)?: NO

## 2022-05-27 SDOH — ECONOMIC STABILITY: FOOD INSECURITY: WITHIN THE PAST 12 MONTHS, YOU WORRIED THAT YOUR FOOD WOULD RUN OUT BEFORE YOU GOT MONEY TO BUY MORE.: NEVER TRUE

## 2022-05-27 ASSESSMENT — PATIENT HEALTH QUESTIONNAIRE - PHQ9
SUM OF ALL RESPONSES TO PHQ QUESTIONS 1-9: 2
SUM OF ALL RESPONSES TO PHQ QUESTIONS 1-9: 2
1. LITTLE INTEREST OR PLEASURE IN DOING THINGS: 1
SUM OF ALL RESPONSES TO PHQ9 QUESTIONS 1 & 2: 2
SUM OF ALL RESPONSES TO PHQ QUESTIONS 1-9: 2
SUM OF ALL RESPONSES TO PHQ QUESTIONS 1-9: 2
2. FEELING DOWN, DEPRESSED OR HOPELESS: 1

## 2022-05-27 ASSESSMENT — SOCIAL DETERMINANTS OF HEALTH (SDOH): HOW HARD IS IT FOR YOU TO PAY FOR THE VERY BASICS LIKE FOOD, HOUSING, MEDICAL CARE, AND HEATING?: NOT HARD AT ALL

## 2022-05-27 ASSESSMENT — ENCOUNTER SYMPTOMS
DIARRHEA: 0
SHORTNESS OF BREATH: 0
COLOR CHANGE: 0
CONSTIPATION: 0
CHEST TIGHTNESS: 0
ABDOMINAL PAIN: 0

## 2022-05-27 NOTE — PROGRESS NOTES
Iona William is a 34 y.o. female who presents in office today with Self establish new care with office. Previous PCP was    Chief Complaint   Patient presents with    New Patient     Establish care, anatoliy bernstein to discuss symtoms of either anxiety vs diabetes        History of Present Illness:     HPI    Here to establish care. Concern for anxiety. For the previous few months, would be crying throughout the day. Since getting in to OB/GYN has felt a weight lifted. Thinks related to stress with being a new mom, working, and health of her father. Recently had first visit with Michaela Godoy at [facility], which she reports went well. Was thinking she may need medication as she was having negative and intrusive thoughts, and feels she has \"turned a corner\" and not feeling that way recently. She would prefer not to take medication if she can avoid it. She would like to have blood sugar checked. Does feel lightheaded at times. Did have blood sugar difficulties when she was a child, would have to step out of the classroom to get a snack. Care gaps: COVID-19 vaccine:   Pfizer , 2021  Colon CA screening:   n/a  Vaccines:    varicella  Hepatitis C/HIV screens:    Done   Breast CA screening:    n/a  OB/GYN, cervical CA screenin/8/2020 negative    Health Maintenance Due   Topic Date Due    Varicella vaccine (1 of 2 - 2-dose childhood series) Never done        Patient Care Team:  THAIS Block CNP as PCP - General (Nurse Practitioner)    Reviewed     [x] Past Medical, Family, and Social History was reviewed per writer and does contribute to the patient presenting condition.     [x] Laboratory Results, Vital signs, Imaging, Active Problems, Immunizations, Current/Recently Discontinued Medications, Health Maintenance Activities Due, Referral Notes (if available) were reviewed per writer     [x] Reviewed Depression screening if taken or valid today or any other valid screening tool (others seen below) Interpretation of Total Score DepressionSeverity: 1-4 = Minimal depression, 5-9 = Mild depression, 10-14 = Moderate depression, 15-19 = Moderately severe depression, 20-27 =Severe depression    PHQ Scores 5/27/2022 5/18/2022   PHQ2 Score 2 2   PHQ9 Score 2 2     Interpretation of Total Score Depression Severity: 1-4 = Minimal depression, 5-9 = Mild depression, 10-14 = Moderate depression, 15-19 = Moderately severe depression, 20-27 = Severe depression     Review of Systems (Subjective)     Review of Systems   Constitutional: Negative for activity change, appetite change and unexpected weight change. Respiratory: Negative for chest tightness and shortness of breath. Cardiovascular: Negative for chest pain and palpitations. Gastrointestinal: Negative for abdominal pain, constipation and diarrhea. Genitourinary: Negative for difficulty urinating and dysuria. Musculoskeletal: Negative for arthralgias and myalgias. Skin: Negative for color change and rash. Neurological: Negative for dizziness, numbness and headaches. Psychiatric/Behavioral: Negative for dysphoric mood and sleep disturbance. The patient is nervous/anxious. See HPI     Physical Assessment (Objective)     /69 (Site: Left Upper Arm, Position: Sitting, Cuff Size: Medium Adult)   Pulse 75   Ht 5' 7\" (1.702 m)   Wt 187 lb (84.8 kg)   BMI 29.29 kg/m²      Physical Exam  Vitals reviewed. Constitutional:       Appearance: She is well-groomed and overweight. HENT:      Head: Normocephalic and atraumatic. Right Ear: External ear normal.      Left Ear: External ear normal.      Nose: Nose normal.      Mouth/Throat:      Mouth: Mucous membranes are moist.      Pharynx: Oropharynx is clear. Eyes:      Extraocular Movements: Extraocular movements intact. Conjunctiva/sclera: Conjunctivae normal.      Pupils: Pupils are equal, round, and reactive to light.    Cardiovascular:      Rate and Rhythm: Normal rate and regular rhythm. Pulses: Normal pulses. Heart sounds: Normal heart sounds. No murmur heard. Pulmonary:      Effort: Pulmonary effort is normal. No respiratory distress. Breath sounds: Normal breath sounds. Abdominal:      General: Bowel sounds are normal.      Palpations: Abdomen is soft. Musculoskeletal:         General: No swelling. Normal range of motion. Cervical back: Normal range of motion and neck supple. Skin:     General: Skin is warm and dry. Capillary Refill: Capillary refill takes less than 2 seconds. Neurological:      Mental Status: She is alert and oriented to person, place, and time. Motor: No weakness. Gait: Gait normal.   Psychiatric:         Mood and Affect: Mood normal.         Behavior: Behavior normal.         Thought Content: Thought content normal.         Judgment: Judgment normal.         Diagnoses / Plan:   1. Encounter to establish care  -     Hemoglobin A1C; Future  -     TSH with Reflex; Future  -     Basic Metabolic Panel; Future  -     CBC; Future  -     Vitamin D 25 Hydroxy; Future  -     Lipid Panel; Future  2. Anxiety  -     Hemoglobin A1C; Future  -     TSH with Reflex; Future  -     Basic Metabolic Panel; Future  -     CBC; Future  -     Vitamin D 25 Hydroxy; Future  3. Episodic lightheadedness  -     Hemoglobin A1C; Future  -     TSH with Reflex; Future  -     Basic Metabolic Panel; Future  -     CBC; Future  -     Vitamin D 25 Hydroxy; Future  4. Screening for cardiovascular condition  -     Lipid Panel; Future  5. Low energy  -     Hemoglobin A1C; Future  -     TSH with Reflex; Future  -     Basic Metabolic Panel; Future  -     CBC; Future  -     Vitamin D 25 Hydroxy; Future     Encouraged to continue follow up with psychology. Patient to advise if anxiety becomes worse or desires to start pharmacologic treatment. Understanding and agreement was voiced with all above plans.  All questions answered to satisfaction. Encouraged healthy diet and exercise. Call office with any questions or new or worsening symptoms or concerns. Return in about 3 months (around 8/27/2022), or if symptoms worsen or fail to improve, for Annual Visit/Physical.            Electronically signed by THAIS Liu CNP on 5/27/2022 at 1:34 PM    Note is dictated utilizing voice recognition software. Unfortunately this leads to occasional typographical errors. Please contact our office if you have any questions.

## 2022-11-01 ENCOUNTER — OFFICE VISIT (OUTPATIENT)
Dept: BEHAVIORAL/MENTAL HEALTH CLINIC | Age: 32
End: 2022-11-01
Payer: COMMERCIAL

## 2022-11-01 DIAGNOSIS — F41.1 GENERALIZED ANXIETY DISORDER: Primary | ICD-10-CM

## 2022-11-01 DIAGNOSIS — F32.9 CURRENT EPISODE OF MAJOR DEPRESSIVE DISORDER WITHOUT PRIOR EPISODE, UNSPECIFIED DEPRESSION EPISODE SEVERITY: ICD-10-CM

## 2022-11-01 PROCEDURE — 90837 PSYTX W PT 60 MINUTES: CPT | Performed by: COUNSELOR

## 2022-11-01 NOTE — PROGRESS NOTES
ADULT BEHAVIORAL HEALTH FOLLOW UP      Visit Date: 11/1/2022   Time of appointment:  9:00 AM   Time spent with Patient: 58 minutes. This is patient's second appointment. Reason for Consult:    Chief Complaint   Patient presents with    Anxiety    Depression       Referring Provider/PCP:    No ref. provider found  THAIS Mcneill - CNP      Pt provided informed consent for the behavioral health program. Discussed with patient model of service to include the limits of confidentiality (i.e. abuse reporting, suicide intervention, etc.) and short-term intervention focused approach. Pt indicated understanding. Amirah Almeida is a 34 y.o. female who presents for follow up of anxiety and depression. She reports relationship stressors with her  related to communication and support. Briana Stark reports she has been crying almost daily for over two weeks related to relationship stressors and feeling like the \"weight of the world is on me. \"  She notes that her father continues to have health problems and seeks support from her mother however mother faces her own stressors. Previous Recommendations: Return for counseling in June 2022. MENTAL STATUS EXAM  Mood was dysthymic with congruent and tearful affect. Suicidal ideation was denied. Homicidal ideation was denied. Hygiene was good . Dress was appropriate. Attention span and concentration appear Within normal limits  Language Use and knowledge base appear Within normal limits  Behavior was Within Normal Limits with No observation or self-report of difficulties ambulating. Attitude was Cooperative, Friendly, and Help-seeking. Eye-contact was good. Speech: rate - WNL, rhythm - WNL, volume - WNL. Verbalizations were coherent. Thought processes were intact and goal-oriented without evidence of delusions, hallucinations, obsessions, or beronica; with no cognitive distortions.    Associations were characterized by intact cognitive processes. Pt was oriented to person, place, time, and general circumstances;  recent:  good and remote:  good. Insight and judgment were estimated to be good, AEB, a good  understanding of cyclical maladaptive patterns, and the ability to use insight to inform behavior change. ASSESSMENT  Alix Ramos presented to the appointment today for evaluation and treatment of symptoms of   Chief Complaint   Patient presents with    Anxiety    Depression    . She is currently deemed no risk to herself or others and meets criteria for Generalized Anxiety Disorder and Unspecified Depressive Disorder. Patel Loomis was in agreement with recommendations. PHQ Scores 5/27/2022 5/18/2022   PHQ2 Score 2 2   PHQ9 Score 2 2     Interpretation of Total Score Depression Severity: 1-4 = Minimal depression, 5-9 = Mild depression, 10-14 = Moderate depression, 15-19 = Moderately severe depression, 20-27 = Severe depression    How often pt has had thoughts of death or hurting self (if PHQ positive for depression):       ANH 7 SCORE 5/18/2022   ANH-7 Total Score 11     Interpretation of ANH-7 score: 5-9 = mild anxiety, 10-14 = moderate anxiety, 15+ = severe anxiety. Recommend referral to behavioral health for scores 10 or greater. DIAGNOSIS/PLAN  1. Generalized anxiety disorder  2.  Current episode of major depressive disorder without prior episode, unspecified depression episode severity     Focus on self and self care  Read information on communication and conflict management    INTERVENTION  Provided handout on  communication and conflict resolution, Discussed various factors related to the development and maintenance of  depression and anxiety (including biological, cognitive, behavioral, and environmental factors), Trained in improving communication skills, Discussed self-care (sleep, nutrition, rewarding activities, social support, exercise), Motivational Interviewing to determine importance and readiness for change, Established rapport, Nicholson-setting to identify pt's primary goals for [employer] [facility] visit / overall health, Supportive techniques, and Emphasized self-care as important for managing overall health      INTERACTIVE COMPLEXITY  Is interactive complexity present?   No  Reason:   N/A  Additional Supporting Information:  N/A       Electronically signed by JERI Light on 11/1/2022 at 1:13 PM

## 2022-11-09 ENCOUNTER — OFFICE VISIT (OUTPATIENT)
Dept: BEHAVIORAL/MENTAL HEALTH CLINIC | Age: 32
End: 2022-11-09
Payer: COMMERCIAL

## 2022-11-09 DIAGNOSIS — F32.9 CURRENT EPISODE OF MAJOR DEPRESSIVE DISORDER WITHOUT PRIOR EPISODE, UNSPECIFIED DEPRESSION EPISODE SEVERITY: ICD-10-CM

## 2022-11-09 DIAGNOSIS — F41.1 GENERALIZED ANXIETY DISORDER: Primary | ICD-10-CM

## 2022-11-09 PROCEDURE — 90837 PSYTX W PT 60 MINUTES: CPT | Performed by: COUNSELOR

## 2022-11-09 NOTE — PROGRESS NOTES
ADULT BEHAVIORAL HEALTH FOLLOW UP      Visit Date: 11/9/2022   Time of appointment:  1:00 PM    Time spent with Patient: 57 minutes. This is patient's third appointment. Reason for Consult:    Chief Complaint   Patient presents with    Anxiety    Depression       Referring Provider/PCP:    No ref. provider found  THAIS Romero CNP      Pt provided informed consent for the behavioral health program. Discussed with patient model of service to include the limits of confidentiality (i.e. abuse reporting, suicide intervention, etc.) and short-term intervention focused approach. Pt indicated understanding. Moe Atkinson is a 34 y.o. female who presents for follow up of anxiety and depression. Racquel Montenegro identified that she feels resentful and is often irritable. She notes several losses which began in 2020 including having a miscarriage, loss of job, pandemic, and relationship with her sister. Racquel Montenegro describes having heart palpitations and chest pain at times which she attributes to the stressors in her life of being a mother and work. She is also concerned about pre-diabetes. Racquel Montenegro noted that she read information on communication and conflict management and this has begun to improve her communication with her . Also, she joined a gym since last session. Previous Recommendations: Focus on self and self care  Read information on communication and conflict management      MENTAL STATUS EXAM  Mood was dysthymic with anxious, congruent, and tearful affect. Suicidal ideation was denied. Homicidal ideation was denied. Hygiene was good . Dress was neat. Attention span and concentration appear Within normal limits  Language Use and knowledge base appear Within normal limits  Behavior was Within Normal Limits with No observation or self-report of difficulties ambulating. Attitude was Cooperative, Friendly, and Help-seeking. Eye-contact was good.   Speech: rate - WNL, rhythm - WNL, volume - WNL. Verbalizations were coherent. Thought processes were intact and goal-oriented without evidence of delusions, hallucinations, obsessions, or beronica; with little cognitive distortions. Associations were characterized by intact cognitive processes. Pt was oriented to person, place, time, and general circumstances;  recent:  good and remote:  good. Insight and judgment were estimated to be fair, AEB, a fair  understanding of cyclical maladaptive patterns, and the ability to use insight to inform behavior change. ASSESSMENT  Rosamaria Montana presented to the appointment today for evaluation and treatment of symptoms of   Chief Complaint   Patient presents with    Anxiety    Depression    . She is currently deemed no risk to herself or others and meets criteria for Generalized Anxiety Disorder and Unspecified Depressive Disorder. Kim Hernandez was in agreement with recommendations. PHQ Scores 5/27/2022 5/18/2022   PHQ2 Score 2 2   PHQ9 Score 2 2     Interpretation of Total Score Depression Severity: 1-4 = Minimal depression, 5-9 = Mild depression, 10-14 = Moderate depression, 15-19 = Moderately severe depression, 20-27 = Severe depression    How often pt has had thoughts of death or hurting self (if PHQ positive for depression):       ANH 7 SCORE 5/18/2022   ANH-7 Total Score 11     Interpretation of ANH-7 score: 5-9 = mild anxiety, 10-14 = moderate anxiety, 15+ = severe anxiety. Recommend referral to behavioral health for scores 10 or greater. DIAGNOSIS/PLAN  1. Generalized anxiety disorder  2.  Current episode of major depressive disorder without prior episode, unspecified depression episode severity       INTERVENTION  Discussed prevalence of  depression and anxiety for general population, Provided handout on  grief, Discussed various factors related to the development and maintenance of  depression and anxiety (including biological, cognitive, behavioral, and environmental factors), Discussed self-care (sleep, nutrition, rewarding activities, social support, exercise), Motivational Interviewing to determine importance and readiness for change, Supportive techniques, Emphasized self-care as important for managing overall health, Provided Psychoeducation re: grief and losss as it relates to mood, CBT to target cognitive distortions of personalization and should statements, Identified maladaptive thoughts, and Provided pt list of websites and several smartphone zachery resources for further practicing guided meditations and breathing exercises      INTERACTIVE COMPLEXITY  Is interactive complexity present?   No  Reason:   N/A  Additional Supporting Information:  N/A       Electronically signed by JERI Quinn on 11/9/2022 at 4:36 PM

## 2022-11-09 NOTE — PATIENT INSTRUCTIONS
Bereavement, Grief & Mourning        Bereavement is the state of having lost a significant other to death. Grief is the personal response to the loss. Mourning is the public expression of that loss. What is Normal Grief? Grief reactions vary depending on who we are, who we lost, our relationship with that person, the circumstances around their passing, and how much their loss affects our day-to-day functioning. Different people may express grief differently and you may even have different grief responses between one loss and another. Reactions to grief and loss include not just emotional symptoms, but also behavioral and physical symptoms. These reactions can often change over time. All are normal for a short period of time. Emotional Behavioral Physical   Shock, denial,                   numbness Crying unexpectedly Exhaustion/Fatigue      Sadness, anxiety, guilt,       fear Sleep changes (increase or decrease) Decreased energy        Anger (at others or        God), Not eating/Weight changes Memory problems        Irritability, frustration Withdrawing from others Stomach and intestinal upset    Restlessness, difficulty concentrating, trouble making decisions Pain and headaches     Symptoms that are not normal and may signal the need to talk to a professional include:  Use of drugs, alcohol, violence, and thoughts of killing oneself. The duration of grief varies from person to person. Current research shows that the average recovery time is 18 to 24 months. Also, grief reactions can be stronger around anniversary or other significant dates, such as the anniversary of the persons death, birthdays, and holidays. The Stages of Grief  Grief therapists often describe stages of grief outlined by the research of Dr. Ashley Giron. These stages do not always go in order. You may move back and forth among some of the stages and may even skip some of them.   These stages are meant as a guide to help you understand your reactions and those of others who are grieving. Denial:  Denial (not acknowledging the loss) can help contain the shock of loss. Denial can act as a safety mechanism to block out grief until we are ready to handle it. Sadness and depression:  Deep, intense grief and mourning appear during this stage. When the full understanding of our loss comes, it can seem overwhelming. During this stage, you may cry often and unexpectedly. You may not want to be around people or to do things that you normally enjoy. During this stage, it is best to remain as active as possible and to seek supportive people who will allow you to say what you need to or to cry when you need to. It is important to allow yourself to work through your full range and experience of emotions. Anger:  Rage and anger can be intense toward the person who , toward friends and relatives, and even toward God. It is important to have an outlet to release anger through activities such as exercise, hobbies, or through therapy. Guilt, shame, and blame are feelings that need to be addressed, especially if it is toward you. Acceptance: This stage includes coming to terms with the loss. It does not mean that you have found the answers to your questions or that you stop thinking about the person who is gone. It does signify a reinvestment in life and a willingness to readjust to your new circumstances while carrying the memory of your loved one with you. How to Help Yourself  Give yourself time to grieve. It is normal and important to express your grief and to work through the concerns that arise for you at this time. Stuffing your feelings may not be helpful and may delay or prolong your grief. Find supportive people to reach out to during your grief. This is the time when the support of others may be the most helpful.   Dont be afraid to tell them how they can best help, even if it means just listening. It is often very helpful to talk about your loss with people who will allow you to express your emotions. Take care of your health. Often after a loss, we stop doing the things we need to for health care, such as exercising, eating correctly, keeping Dr. appointments, or taking prescribed medications. If you are on a health care regimen, it is important to continue to adhere to your treatment. Postpone major life changes. Give yourself time to adjust to your loss before making plans to change jobs, move or sell your home, remarry, etc.  Grief can sometimes cloud your judgment and ability to make decisions. Consider keeping a journal.  It is often helpful to write or tell the story of your loss and what it means to you as a way to work through your feelings. Participate in activities. Staying active through exercise, enjoyable activities, outings with supportive others, or even starting new hobbies can help us get through tough times while providing opportunities for constructive development and use of energy. Find a way to memorialize your loved one. Planting a tree or garden in the name of your loved one, dedicating a work to their memory, contributing to a la in their name, and other such activities can be helpful. Consider joining grief-support groups or contacting a grief counselor for additional support and help. Remember that depressive symptoms (feeling sad) are a fundamental part of normal bereavement. Staying active and finding support from others can help you to work through the grief process. References  ASHLEIGH Alvarez, Maine Bangura T, REBEKAH Hilliard, SCOTTIE Pitt, & LIZ Berry. (2005). Does widowhood affect memory performance of older persons? Psychological Medicine, 35(2), 217-226. Rosa Francois, LESLIE Henson, & UMBERTO Celestin. (2005). Health behaviors associated with better quality of life for older bereaved persons.   Journal of Palliative Medicine, 8(1), . Phil Garcia.  (2004). Working with grieving adults. Advances in Psychiatric Treatment, 10, 164-170. Arpit Don JT, Jamie, [address] [facility], & Maricarmen Munoz. (2004). Life after death: Greif therapy after the suddent traumatic death of a family member. Perspectives in Psychiatric Care, 40(4), 156-285. Reggie, [address] [facility], , KARLA Borjas, & STEPHANIE Garcia.  (2000). Bereavement services in acute care settings. Death Studies, 24, 51-64. Lenora Johnson, [address], [place]  (2000). Psychotherapy of traumatic grief:  A review of evidence for psychotherapeutic treatment. Death Studies, 24, 479-495. NIGEL Mcclure. (2004). Connections between counseling theories and current theories of grief and mourning. Journal of Mental Health Counseling, 26(2), 125-145. Grief Tips - Help for Those Who Mourn    The following are many ideas to help people who are mourning a loved ones death. Different kinds of losses dictate different responses, so not all of these ideas will suit everyone. Likewise, no two people grieve alike - what works for one may not work for another. Treat this list for what it is; a gathering of assorted suggestions that various people have tried with success. Perhaps what helped them will help you. The emphasis here is on specific, practical ideas. Talk regularly with a friend. Talking with another about what you think and feel is one of the best things you can do for yourself. It helps relieve some of the pressure you may feel, it can give you a sense of perspective, and it keeps you in touch with others. Look for someone whos a good listener and who has a caring soul. Then speak whats on your mind and in your heart. If this feels one-sided let that be okay for this period of your life. Chances are the other person will find meaning in what theyre doing, and time will come when youll have the chance to be a good listener for someone else.   Youll be a better listener then, if youre a good talker now. Walk. Go for walks outside every day if you can. Dont overdo it, but walk briskly enough that it feels invigorating. Sometimes try walking slowly enough so you can look carefully at what you see. Observe what nature has to offer you, what it can teach you. Enjoy as much as you are able to of the sights and sounds that come your way. If you like, walk with another person. Carry or wear a linking object. Carry something in your pocket or purse that reminds you of the one who  - a keepsake they gave you perhaps, or small object they once carried or used or a memento you select for just this purpose. You might wear a piece of their jewelry in the same way. Whenever you want, reach for gaze upon this object and remember what it signifies. Visit the grave. Not all people prefer to do this. But if it feels right to you, then do so. Dont let others convince you this is a morbid thing to do. Spend whatever time feels right there. Stand or sit in the quietness and do what comes naturally: be silent or talk, breathe deeply or cry, recollect or pray. You may wish to add your distinctive touch to the gravesite - straighten it a bit, or add little signs of your love. Create a memory book. Compile photographs, which document your loved ones life. Arrange them into some sort of order so they tell a story. Add other elements if you want: diplomas, newspaper clippings, awards, accomplishments, and reminders of significant events. Put all this in a special binder and keep it for other people to look at if they wish. Go through it on your own if you desire. Reminisce as you do so. Recall your dreams. Your dreams often have important things to say about your feelings and about your relationship with the one who . Your dreams may be scary or sad, especially early on. They may seem weird or crazy to you. You may find that your loved one appears in your dreams.   Accept your dreams for what they are and see what you can learn from them. No one knows that better than you. Tell people what helps you and what doesnt. People around you may not understand what you need. So tell them. If hearing your loved ones name spoken aloud by others feels good, say so. If you need more time alone, or assistance with chores youre unable to complete, or an occasional hug, be honest.  People cant read your mind, so youll have to speak it. Write things down. Most people who are grieving become more forgetful than usual.  So help yourself remember what you want by keeping track of it on paper or with whatever system works best for you. This may include writing down things you want to preserve about the person who has . Ask for a copy of the [place]'s. If the  liturgy or memorial service holds special meaning for you because of what was spoken or read, ask for the words. Whoever participated in that ritual will feel gratified that what they prepared was appreciated. Turn to these words whenever you want. Some people find these thoughts provide even more help weeks and months after the service. Remember the serenity prayer. This prayer is attributed to theologian Pilar Ron, but its actually an ancient  [facility]. It has brought comfort and support to many that have suffered various kinds of afflictions. Create a memory area at home. In a space that feels appropriate, arrange a small table that honors the person: a framed photograph or two, perhaps a prized possession or award or something they created or something they loved. This might be placed on a small table, a mantel or a desk. Some people like to use a grouping of candles, representing not just the person who  but others who have  as well. In that case a variety of candles can be arranged each representing a unique life. Drink water.   Grieving people can easily become dehydrated. Crying can naturally lead to that. And with your normal routines turned upside down, you may simply not drink as much or as regularly as you did before this death. Make this a way you care for yourself. Use your hands. Sometimes theres value in doing repetitive things with your hands, something you dont have to think about very much because it becomes second nature. Knitting and crocheting are like that. So are carving, woodworking, polishing, solving jigsaw puzzles, painting, braiding, shoveling, washing and countless other activities. Give yourself respites from your grief. Just because youre grieving doesnt mean you must always be feeling sad or     forlorn. Theres value in sometimes consciously deciding that youll think about something else for awhile, or that youll do something youve always enjoyed doing. Sometimes this happens naturally and its only later you realize that your grief has taken a back seat. Let it, this is not an indication you love that person any less or that youre forgetting them. Its a sign that youre human and you need relief from the unrelenting pressure. It can also be a healthy sign youre healing. See a grief counselor. If youre concerned about how youre feeling and how well youre adapting make an appointment   with a counselor who specializes in grief. Often youll learn what you need both about grief and about yourself as a griever in only a few sessions. Ask questions of the counselor before you sign on. What specific training does he or she have? What accreditation? A person who is a family therapist or a psychologist doesnt necessarily understand the unique issues of someone in grief. Begin your day with your loved one. If your grief is young, youll probably wake up thinking of that person anyway. So why not decide that youll include her or him from the start? Focus this time in a positive way.   Bring to your mind fulfilling memories. Recall lessons that this person taught you, gifts he or she gave you. Think about how you can spend your day in ways that would be keeping in with your loved ones best self and with your best self. Then carry that best self with you through your day. Invite some one to be your telephone john. If your grief and sadness hit you especially hard at times and you have no   one nearby to turn to, ask someone you trust to be your telephone john. Ask their permission for you to call them whenever you feel youre at loose ends, day or night. Then put their number beside your phone and call them if you need them. Dont abuse the privilege, of course. And covenant that some day it will be payback time - someday youll make yourself available to help someone else in the same way youve been helped. That will help you accept the care youre receiving. 18. Avoid certain people if you must.  No one likes to be unfriendly or cold. But if there are people in your life who make it  very difficult for you to do your grieving then do what you can to stay out of their way. Some people may lecture you or belittle you. Donate their possessions meaningfully. Whether you give your loved ones personal possessions to someone you know   or to a stranger, find ways to pass these things along so that others might benefit from them. Family members of friends might like to receive keepsakes. They or others might deserve tools, utensils, books or sporting equipment. [employer] organizations can put clothes to good use. Some wish to do this quickly following the death, while others wish to wait awhile. Donate in the others name. Honor the [employer] and spirit by giving a gift or gifts to a cause the other would   appreciate. A favorite la? A local ? A building project? Extend that persons influence even further. Create or commission a memory quilt.   Sew or invite have to tell about the one, who , stories youre familiar with and those youve never heard before. Spend time with their friends, schoolmates or colleagues. Invite them into your home. Solicit the writings of others. Preserve whatever you find out. Celebrate your time together. Take a day off. When the mood is just right, take a one-day vacation. Do whatever you want, or dont do whatever you want. Travel somewhere or stay inside by yourself. Be very active or dont do anything at all. Just make it your day, whatever that means for you. Invite someone to give you feedback. Select someone you trust, preferably someone familiar with the working of grief, to give you his or her reaction when you ask for it. If you want to check out how clearly youre thinking, how accurately youre remembering, how effectively youre coping, go to that person. Pose your questions, then listen to their responses. What you choose to do with that information will be up to you. Vent your anger rather than hold it in. You may feel awkward being angry when youre grieving, but anger is a common reaction. The expression holds true: anger is best out floating rather than in bloating. Even if you feel a bit ashamed as you do it, find ways to get it out of your system. [place], even if its in an empty house. Cry. Hit something soft. Throw eggs at something hard. Vacuum up a storm. Resist the temptation to be proper. Give thanks every day. Whatever has happened to you, you still have things to be thankful for. Perhaps its your memories, your remaining family, your support, your work; you own health - all sorts of things. Draw your attention to those parts of life that are worth appreciating, then appreciate them. Monitor signs of dependency. While its normal to become more dependent upon others for awhile immediately after a death, it will not be helpful to continue in that role long-term.   Watch for signs that youre prolonging your need for assistance. Congratulate yourself when you do things for yourself.

## 2022-11-30 ENCOUNTER — OFFICE VISIT (OUTPATIENT)
Dept: BEHAVIORAL/MENTAL HEALTH CLINIC | Age: 32
End: 2022-11-30

## 2022-11-30 DIAGNOSIS — F32.9 CURRENT EPISODE OF MAJOR DEPRESSIVE DISORDER WITHOUT PRIOR EPISODE, UNSPECIFIED DEPRESSION EPISODE SEVERITY: ICD-10-CM

## 2022-11-30 DIAGNOSIS — F41.1 GENERALIZED ANXIETY DISORDER: Primary | ICD-10-CM

## 2022-11-30 NOTE — PROGRESS NOTES
ADULT BEHAVIORAL HEALTH FOLLOW UP      Visit Date: 11/30/2022   Time of appointment:  10:00 AM    Time spent with Patient: 50 minutes. This is patient's fourth appointment. Reason for Consult:    Chief Complaint   Patient presents with    Anxiety    Depression       Referring Provider/PCP:    No ref. provider found  THAIS Keene CNP      Pt provided informed consent for the behavioral health program. Discussed with patient model of service to include the limits of confidentiality (i.e. abuse reporting, suicide intervention, etc.) and short-term intervention focused approach. Pt indicated understanding. Baldemar Closs is a 34 y.o. female who presents for follow up of anxiety and depression. She reports stable mood with some episodes of exacerbation triggered by relationships and parenting. Diana Avina identifies feelings of loneliness and stress and notes comparing herself to others. Previous Recommendations: Guided meditations and breathing exercises. MENTAL STATUS EXAM  Mood was anxious with congruent and tearful affect. Suicidal ideation was denied. Homicidal ideation was denied. Hygiene was good . Dress was neat. Attention span and concentration appear Within normal limits  Language Use and knowledge base appear Within normal limits  Behavior was Within Normal Limits with No observation or self-report of difficulties ambulating. Attitude was Cooperative, Friendly, and Help-seeking. Eye-contact was good. Speech: rate - WNL, rhythm - WNL, volume - WNL. Verbalizations were coherent. Thought processes were intact and goal-oriented without evidence of delusions, hallucinations, obsessions, or beronica; with little cognitive distortions. Associations were characterized by intact cognitive processes. Pt was oriented to person, place, time, and general circumstances;  recent:  good and remote:  good.   Insight and judgment were estimated to be fair, AEB, a fair  understanding of cyclical maladaptive patterns, and the ability to use insight to inform behavior change. ASSESSMENT  Hema Alejandre presented to the appointment today for evaluation and treatment of symptoms of   Chief Complaint   Patient presents with    Anxiety    Depression    . She is currently deemed no risk to herself or others and meets criteria for Generalized Anxiety Disorder and Unspecified Depressive Disorder. [facility] was in agreement with recommendations. PHQ Scores 5/27/2022 5/18/2022   PHQ2 Score 2 2   PHQ9 Score 2 2     Interpretation of Total Score Depression Severity: 1-4 = Minimal depression, 5-9 = Mild depression, 10-14 = Moderate depression, 15-19 = Moderately severe depression, 20-27 = Severe depression    How often pt has had thoughts of death or hurting self (if PHQ positive for depression):       ANH 7 SCORE 5/18/2022   ANH-7 Total Score 11     Interpretation of ANH-7 score: 5-9 = mild anxiety, 10-14 = moderate anxiety, 15+ = severe anxiety. Recommend referral to behavioral health for scores 10 or greater. DIAGNOSIS/PLAN  1. Generalized anxiety disorder  2. Current episode of major depressive disorder without prior episode, unspecified depression episode severity     Focus on self / Practice self care  Research marriage/couples therapists  Utilize learned communication skills and state needs    INTERVENTION  Discussed various factors related to the development and maintenance of  depression and anxiety (including biological, cognitive, behavioral, and environmental factors), Trained in improving communication skills, Discussed self-care (sleep, nutrition, rewarding activities, social support, exercise), Supportive techniques, Emphasized self-care as important for managing overall health, CBT to target cognitive distortions including personalization, Identified maladaptive thoughts, and Problem-solving re: identifying in-network marriage/couples therapists.       INTERACTIVE COMPLEXITY  Is interactive complexity present?   No  Reason:   N/A  Additional Supporting Information:  N/A       Electronically signed by JERI Tyler on 11/30/2022 at 12:51 PM

## 2023-01-04 ENCOUNTER — OFFICE VISIT (OUTPATIENT)
Dept: BEHAVIORAL/MENTAL HEALTH CLINIC | Age: 33
End: 2023-01-04

## 2023-01-04 DIAGNOSIS — F32.9 CURRENT EPISODE OF MAJOR DEPRESSIVE DISORDER WITHOUT PRIOR EPISODE, UNSPECIFIED DEPRESSION EPISODE SEVERITY: ICD-10-CM

## 2023-01-04 DIAGNOSIS — F41.1 GENERALIZED ANXIETY DISORDER: Primary | ICD-10-CM

## 2023-01-04 NOTE — PROGRESS NOTES
ADULT BEHAVIORAL HEALTH FOLLOW UP      Visit Date: 1/4/2023   Time of appointment:  10:00 AM    Time spent with Patient: 53 minutes. This is patient's fifth appointment. Reason for Consult:    Chief Complaint   Patient presents with    Anxiety       Referring Provider/PCP:    No ref. provider found  Thomas Peoples, APRN - CNP      Pt provided informed consent for the behavioral health program. Discussed with patient model of service to include the limits of confidentiality (i.e. abuse reporting, suicide intervention, etc.) and short-term intervention focused approach. Pt indicated understanding. Linda Dotson is a 34 y.o. female who presents for follow up of anxiety. Vicente Tomlinson reports stressors related to her sister's health and identified feeling \"helpless\" which upsets her. She notes having to set boundaries with her brother in law due to his temper. Vicente Tomlinson is asking for mental health resources to share with her sister. She identifies that her  continues to have health problems and she plans to accompany him to his doctors appointment. Her  reportedly declined going to couples therapy. Previous Recommendations: Focus on self / Practice self care  Research marriage/couples therapists  Utilize learned communication skills and state needs      MENTAL STATUS EXAM  Mood was dysthymic with anxious and tearful affect. Suicidal ideation was denied. Homicidal ideation was denied. Hygiene was good . Dress was neat. Attention span and concentration appear Within normal limits  Language Use and knowledge base appear Within normal limits  Behavior was Within Normal Limits with No observation or self-report of difficulties ambulating. Attitude was Cooperative, Friendly, and Help-seeking. Eye-contact was good. Speech: rate - WNL, rhythm - WNL, volume - WNL. Verbalizations were goal directed and coherent.   Thought processes were intact and goal-oriented without evidence of delusions, hallucinations, obsessions, or beronica; with little cognitive distortions. Associations were characterized by intact cognitive processes. Pt was oriented to person, place, time, and general circumstances;  recent:  good and remote:  good. Insight and judgment were estimated to be good, AEB, a good  understanding of cyclical maladaptive patterns, and the ability to use insight to inform behavior change. ASSESSMENT  Jazmine Abreu presented to the appointment today for evaluation and treatment of symptoms of   Chief Complaint   Patient presents with    Anxiety    . She is currently deemed no risk to self or others and meets criteria for anxiety and depression. Sarah Beth Galvan was in agreement with recommendations. PHQ Scores 5/27/2022 5/18/2022   PHQ2 Score 2 2   PHQ9 Score 2 2     Interpretation of Total Score Depression Severity: 1-4 = Minimal depression, 5-9 = Mild depression, 10-14 = Moderate depression, 15-19 = Moderately severe depression, 20-27 = Severe depression    How often pt has had thoughts of death or hurting self (if PHQ positive for depression):       ANH 7 SCORE 5/18/2022   ANH-7 Total Score 11     Interpretation of ANH-7 score: 5-9 = mild anxiety, 10-14 = moderate anxiety, 15+ = severe anxiety. Recommend referral to behavioral health for scores 10 or greater. DIAGNOSIS/PLAN  1. Generalized anxiety disorder  2. Current episode of major depressive disorder without prior episode, unspecified depression episode severity     Be supportive and set healthy boundaries.     Practice self care  [employer] - Healthy Habits Challenge 2023    INTERVENTION  Discussed various factors related to the development and maintenance of  depression and anxiety (including biological, cognitive, behavioral, and environmental factors), Trained in improving communication skills, Discussed self-care (sleep, nutrition, rewarding activities, social support, exercise), [place]-setting to identify pt's primary goals for [facility] visit / overall health, Supportive techniques, Emphasized self-care as important for managing overall health, Provided Psychoeducation re: supporting loved ones with mental health challenges, and Provided pt list of websites and several smartphone zachery resources for further practicing guided meditations and breathing exercises      INTERACTIVE COMPLEXITY  Is interactive complexity present?   No  Reason:   N/A  Additional Supporting Information:  N/A       Electronically signed by JERI Danielson on 1/4/2023 at 1:02 PM

## 2023-04-24 ENCOUNTER — PROCEDURE VISIT (OUTPATIENT)
Dept: OBGYN CLINIC | Age: 33
End: 2023-04-24
Payer: COMMERCIAL

## 2023-04-24 VITALS
BODY MASS INDEX: 29.29 KG/M2 | HEIGHT: 67 IN | SYSTOLIC BLOOD PRESSURE: 111 MMHG | DIASTOLIC BLOOD PRESSURE: 66 MMHG | HEART RATE: 62 BPM

## 2023-04-24 DIAGNOSIS — Z31.69 PRE-CONCEPTION COUNSELING: ICD-10-CM

## 2023-04-24 DIAGNOSIS — Z30.432 ENCOUNTER FOR IUD REMOVAL: Primary | ICD-10-CM

## 2023-04-24 DIAGNOSIS — Z97.5 IUD (INTRAUTERINE DEVICE) IN PLACE: ICD-10-CM

## 2023-04-24 PROBLEM — Z33.1 INCIDENTAL PREGNANCY: Status: RESOLVED | Noted: 2020-10-20 | Resolved: 2023-04-24
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
## 2023-04-24 PROCEDURE — 58301 REMOVE INTRAUTERINE DEVICE: CPT | Performed by: STUDENT IN AN ORGANIZED HEALTH CARE EDUCATION/TRAINING PROGRAM

## 2023-04-24 NOTE — PROGRESS NOTES
[address] [facility]  [address] [facility], [address] [place] [address]    Procedure Note    Koko Barbour  4/24/2023                     Primary Care Physician: Claudene Lipoma, APRN - KARYNA      Subjective:   Koko Barbour 28 y.o. female [id number] is here for previously scheduled IUD removal. Her IUD was placed 6 weeks postpartum  back in 2021. She has had no issues with her IUD and has loved it. She desires removal today because she wants to attempt to get pregnant again. She has been taking prenatal vitamins for the last couple weeks. She is thinking after her next pregnancy that her  is going to get a vasectomy. Vitals:   Vitals:    04/24/23 0940   BP: 111/66   Site: Right Upper Arm   Position: Sitting   Cuff Size: Large Adult   Pulse: 62   Height: 5' 7\" (1.702 m)     Pelvic Exam  Vulva: normal hair distribution, no genital lymphadenopathy, no genital warts  Urethral Meatus: within normal limits   Vagina: normal appearing mucosa, no prolapse, no vaginal bleeding, scant  white vaginal discharge in posterior vault, no abnormal lesions or lacerations   Cervix: cervix thick, os visibly closed, no lacerations or abnormal lesions visualized, strings seen at os      Procedure: IUD removal    Indications: desires pregnancy     Procedure Details: The patient was counseled on the procedure. Risks, benefits and alternatives were reviewed. The patient is aware that this is diagnostic and not curative and a second procedure may be needed. A consent was reviewed and obtained. Chaperone declined. Removal of IUD  A sterile speculum was placed without incident and the string was identified at the cervical portio. The site was then cleansed with Betadine and the string was grasped with a ring forcep and the IUD was removed without incident. IUD was shown to patient and discarded. The IUD was not sent to pathology.          Assessment & Plan:  Koko Barbour 28 y.o. female [id number] here for IUD

## 2023-09-19 ENCOUNTER — TELEPHONE (OUTPATIENT)
Dept: OBGYN CLINIC | Age: 33
End: 2023-09-19

## 2023-09-19 DIAGNOSIS — R12 HEART BURN: ICD-10-CM

## 2023-09-19 DIAGNOSIS — O21.9 NAUSEA AND VOMITING IN PREGNANCY: Primary | ICD-10-CM

## 2023-09-19 RX ORDER — CALCIUM CARBONATE 500 MG/1
1 TABLET, CHEWABLE ORAL DAILY
Qty: 30 TABLET | Refills: 0 | Status: SHIPPED | OUTPATIENT
Start: 2023-09-19 | End: 2023-10-19

## 2023-09-19 RX ORDER — PYRIDOXINE HCL (VITAMIN B6) 25 MG
25 TABLET ORAL 2 TIMES DAILY
Qty: 60 TABLET | Refills: 1 | Status: SHIPPED | OUTPATIENT
Start: 2023-09-19

## 2023-09-19 NOTE — TELEPHONE ENCOUNTER
Pt has a confirmation on 9/29 but is having some really bad nausea and reflux is wondering if she could have something to help with the both of those. Pt is using the [facility] on [address].

## 2023-09-25 ENCOUNTER — TELEPHONE (OUTPATIENT)
Dept: OBGYN CLINIC | Age: 33
End: 2023-09-25

## 2023-09-25 DIAGNOSIS — O21.9 NAUSEA AND VOMITING DURING PREGNANCY: Primary | ICD-10-CM

## 2023-09-25 RX ORDER — ONDANSETRON 4 MG/1
4 TABLET, ORALLY DISINTEGRATING ORAL EVERY 8 HOURS PRN
Qty: 90 TABLET | Refills: 3 | Status: SHIPPED | OUTPATIENT
Start: 2023-09-25

## 2023-09-25 NOTE — TELEPHONE ENCOUNTER
Pt with a confirmation on 9/29 having some really bad nausea Dr Jossie Bradford had sent in some meds on the 19th and was ok for a little and now its really bad again. Can barely hold anything down has not had a full meal in about a week. Pt is dizzy and weak.

## 2023-09-29 ENCOUNTER — HOSPITAL ENCOUNTER (OUTPATIENT)
Age: 33
Setting detail: SPECIMEN
Discharge: HOME | End: 2023-09-29

## 2023-09-29 ENCOUNTER — OFFICE VISIT (OUTPATIENT)
Dept: OBGYN CLINIC | Age: 33
End: 2023-09-29

## 2023-09-29 ENCOUNTER — HOSPITAL ENCOUNTER (EMERGENCY)
Age: 33
Discharge: HOME | End: 2023-09-29
Attending: EMERGENCY MEDICINE
Payer: COMMERCIAL

## 2023-09-29 VITALS
WEIGHT: 173 LBS | HEART RATE: 75 BPM | BODY MASS INDEX: 27.15 KG/M2 | DIASTOLIC BLOOD PRESSURE: 58 MMHG | RESPIRATION RATE: 15 BRPM | TEMPERATURE: 98.3 F | OXYGEN SATURATION: 99 % | HEIGHT: 67 IN | SYSTOLIC BLOOD PRESSURE: 91 MMHG

## 2023-09-29 VITALS
DIASTOLIC BLOOD PRESSURE: 84 MMHG | BODY MASS INDEX: 27.31 KG/M2 | SYSTOLIC BLOOD PRESSURE: 118 MMHG | WEIGHT: 174 LBS | HEIGHT: 67 IN

## 2023-09-29 DIAGNOSIS — Z3A.08 8 WEEKS GESTATION OF PREGNANCY: ICD-10-CM

## 2023-09-29 DIAGNOSIS — O21.1 HYPEREMESIS GRAVIDARUM BEFORE END OF 22 WEEK GESTATION WITH DEHYDRATION: ICD-10-CM

## 2023-09-29 DIAGNOSIS — R11.2 NAUSEA AND VOMITING, UNSPECIFIED VOMITING TYPE: Primary | ICD-10-CM

## 2023-09-29 DIAGNOSIS — Z32.01 POSITIVE PREGNANCY TEST: Primary | ICD-10-CM

## 2023-09-29 DIAGNOSIS — Z32.01 POSITIVE PREGNANCY TEST: ICD-10-CM

## 2023-09-29 LAB
ANION GAP SERPL CALCULATED.3IONS-SCNC: 11 MMOL/L (ref 9–17)
BASOPHILS # BLD: 0.04 K/UL (ref 0–0.2)
BASOPHILS NFR BLD: 1 % (ref 0–2)
BUN SERPL-MCNC: 6 MG/DL (ref 6–20)
BUN/CREAT SERPL: 12 (ref 9–20)
CALCIUM SERPL-MCNC: 9.3 MG/DL (ref 8.6–10.4)
CHLORIDE SERPL-SCNC: 96 MMOL/L (ref 98–107)
CO2 SERPL-SCNC: 25 MMOL/L (ref 20–31)
CREAT SERPL-MCNC: 0.5 MG/DL (ref 0.5–0.9)
EKG ATRIAL RATE: 69 BPM
EKG P AXIS: 67 DEGREES
EKG P-R INTERVAL: 144 MS
EKG Q-T INTERVAL: 406 MS
EKG QRS DURATION: 88 MS
EKG QTC CALCULATION (BAZETT): 435 MS
EKG R AXIS: 40 DEGREES
EKG T AXIS: 45 DEGREES
EKG VENTRICULAR RATE: 69 BPM
EOSINOPHIL # BLD: 0.04 K/UL (ref 0–0.44)
EOSINOPHILS RELATIVE PERCENT: 1 % (ref 1–4)
ERYTHROCYTE [DISTWIDTH] IN BLOOD BY AUTOMATED COUNT: 11.3 % (ref 11.8–14.4)
GFR SERPL CREATININE-BSD FRML MDRD: >60 ML/MIN/1.73M2
GLUCOSE SERPL-MCNC: 82 MG/DL (ref 70–99)
HCT VFR BLD AUTO: 40.4 % (ref 36.3–47.1)
HGB BLD-MCNC: 13.6 G/DL (ref 11.9–15.1)
IMM GRANULOCYTES # BLD AUTO: 0.01 K/UL (ref 0–0.3)
IMM GRANULOCYTES NFR BLD: 0 %
LYMPHOCYTES NFR BLD: 1.86 K/UL (ref 1.1–3.7)
LYMPHOCYTES RELATIVE PERCENT: 28 % (ref 24–43)
MAGNESIUM SERPL-MCNC: 2.1 MG/DL (ref 1.6–2.6)
MCH RBC QN AUTO: 29.1 PG (ref 25.2–33.5)
MCHC RBC AUTO-ENTMCNC: 33.7 G/DL (ref 28.4–34.8)
MCV RBC AUTO: 86.5 FL (ref 82.6–102.9)
MONOCYTES NFR BLD: 0.43 K/UL (ref 0.1–1.2)
MONOCYTES NFR BLD: 6 % (ref 3–12)
NEUTROPHILS NFR BLD: 64 % (ref 36–65)
NEUTS SEG NFR BLD: 4.31 K/UL (ref 1.5–8.1)
NRBC BLD-RTO: 0 PER 100 WBC
PLATELET # BLD AUTO: 198 K/UL (ref 138–453)
PMV BLD AUTO: 11.4 FL (ref 8.1–13.5)
POTASSIUM SERPL-SCNC: 3.9 MMOL/L (ref 3.7–5.3)
RBC # BLD AUTO: 4.67 M/UL (ref 3.95–5.11)
SODIUM SERPL-SCNC: 132 MMOL/L (ref 135–144)
WBC OTHER # BLD: 6.7 K/UL (ref 3.5–11.3)

## 2023-09-29 PROCEDURE — 2580000003 HC RX 258: Performed by: EMERGENCY MEDICINE

## 2023-09-29 PROCEDURE — 85025 COMPLETE CBC W/AUTO DIFF WBC: CPT

## 2023-09-29 PROCEDURE — 83735 ASSAY OF MAGNESIUM: CPT

## 2023-09-29 PROCEDURE — 80048 BASIC METABOLIC PNL TOTAL CA: CPT

## 2023-09-29 PROCEDURE — 99284 EMERGENCY DEPT VISIT MOD MDM: CPT

## 2023-09-29 PROCEDURE — 96374 THER/PROPH/DIAG INJ IV PUSH: CPT

## 2023-09-29 PROCEDURE — 93005 ELECTROCARDIOGRAM TRACING: CPT | Performed by: EMERGENCY MEDICINE

## 2023-09-29 PROCEDURE — 6360000002 HC RX W HCPCS: Performed by: EMERGENCY MEDICINE

## 2023-09-29 RX ORDER — 0.9 % SODIUM CHLORIDE 0.9 %
1000 INTRAVENOUS SOLUTION INTRAVENOUS ONCE
Status: COMPLETED | OUTPATIENT
Start: 2023-09-29 | End: 2023-09-29

## 2023-09-29 RX ORDER — ONDANSETRON 2 MG/ML
4 INJECTION INTRAMUSCULAR; INTRAVENOUS ONCE
Status: COMPLETED | OUTPATIENT
Start: 2023-09-29 | End: 2023-09-29

## 2023-09-29 RX ADMIN — SODIUM CHLORIDE 1000 ML: 9 INJECTION, SOLUTION INTRAVENOUS at 13:05

## 2023-09-29 RX ADMIN — ONDANSETRON 4 MG: 2 INJECTION INTRAMUSCULAR; INTRAVENOUS at 13:06

## 2023-09-29 ASSESSMENT — PAIN SCALES - GENERAL: PAINLEVEL_OUTOF10: 0

## 2023-09-29 ASSESSMENT — ENCOUNTER SYMPTOMS
EYE PAIN: 0
ABDOMINAL DISTENTION: 0
CHEST TIGHTNESS: 0
BACK PAIN: 0
FACIAL SWELLING: 0
EYE DISCHARGE: 0
SHORTNESS OF BREATH: 0
NAUSEA: 1
ABDOMINAL PAIN: 0
VOMITING: 1

## 2023-09-29 ASSESSMENT — PAIN - FUNCTIONAL ASSESSMENT: PAIN_FUNCTIONAL_ASSESSMENT: 0-10
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
## 2023-09-29 NOTE — ED NOTES
Pt is 8w pregnant with her second child. Pt states she has been feeling sick the past 3 weeks with nausea and vomiting 3-4x day. Pt reports feeling dizzy and lightheaded over the past week. Pt is alert and oriented x4.      Rolando Manley RN  09/29/23 941 MATTY Ramos  09/29/23 9108
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
## 2023-09-30 LAB
AMPHET UR QL SCN: NEGATIVE
BACTERIA URNS QL MICRO: ABNORMAL
BARBITURATES UR QL SCN: NEGATIVE
BENZODIAZ UR QL: NEGATIVE
BILIRUB UR QL STRIP: ABNORMAL
CANDIDA SPECIES: POSITIVE
CANNABINOIDS UR QL SCN: NEGATIVE
CASTS #/AREA URNS LPF: ABNORMAL /LPF (ref 0–8)
CLARITY UR: ABNORMAL
COCAINE UR QL SCN: NEGATIVE
COLOR UR: ABNORMAL
EPI CELLS #/AREA URNS HPF: ABNORMAL /HPF (ref 0–5)
FENTANYL UR QL: NEGATIVE
GARDNERELLA VAGINALIS: NEGATIVE
GLUCOSE UR STRIP-MCNC: NEGATIVE MG/DL
HGB UR QL STRIP.AUTO: NEGATIVE
KETONES UR STRIP-MCNC: ABNORMAL MG/DL
LEUKOCYTE ESTERASE UR QL STRIP: ABNORMAL
METHADONE UR QL: NEGATIVE
MICROORGANISM SPEC CULT: NORMAL
NITRITE UR QL STRIP: NEGATIVE
OPIATES UR QL SCN: NEGATIVE
OXYCODONE UR QL SCN: NEGATIVE
PCP UR QL SCN: NEGATIVE
PH UR STRIP: 6 [PH] (ref 5–8)
PROT UR STRIP-MCNC: ABNORMAL MG/DL
RBC #/AREA URNS HPF: ABNORMAL /HPF (ref 0–4)
SOURCE: ABNORMAL
SP GR UR STRIP: 1.03 (ref 1–1.03)
SPECIMEN DESCRIPTION: NORMAL
TEST INFORMATION: NORMAL
TRICHOMONAS: NEGATIVE
UROBILINOGEN UR STRIP-ACNC: NORMAL EU/DL (ref 0–1)
WBC #/AREA URNS HPF: ABNORMAL /HPF (ref 0–5)

## 2023-10-02 LAB
C TRACH DNA SPEC QL PROBE+SIG AMP: NEGATIVE
N GONORRHOEA DNA SPEC QL PROBE+SIG AMP: NEGATIVE
SPECIMEN DESCRIPTION: NORMAL

## 2023-10-03 LAB
HPV I/H RISK 4 DNA CVX QL NAA+PROBE: NOT DETECTED
HPV SAMPLE: NORMAL
HPV, INTERPRETATION: NORMAL
HPV16 DNA CVX QL NAA+PROBE: NOT DETECTED
HPV18 DNA CVX QL NAA+PROBE: NOT DETECTED
SPECIMEN DESCRIPTION: NORMAL

## 2023-10-08 LAB — CYTOLOGY REPORT: NORMAL

## 2023-10-11 DIAGNOSIS — O21.9 NAUSEA AND VOMITING IN PREGNANCY: ICD-10-CM

## 2023-10-12 ENCOUNTER — HOSPITAL ENCOUNTER (OUTPATIENT)
Age: 33
Setting detail: SPECIMEN
Discharge: HOME | End: 2023-10-12

## 2023-10-12 ENCOUNTER — INITIAL PRENATAL (OUTPATIENT)
Dept: OBGYN CLINIC | Age: 33
End: 2023-10-12

## 2023-10-12 VITALS
HEIGHT: 67 IN | SYSTOLIC BLOOD PRESSURE: 108 MMHG | DIASTOLIC BLOOD PRESSURE: 62 MMHG | BODY MASS INDEX: 26.68 KG/M2 | WEIGHT: 170 LBS

## 2023-10-12 DIAGNOSIS — O34.219 HX OF CESAREAN SECTION COMPLICATING PREGNANCY: ICD-10-CM

## 2023-10-12 DIAGNOSIS — Z87.59 HISTORY OF GESTATIONAL HYPERTENSION: ICD-10-CM

## 2023-10-12 DIAGNOSIS — Z32.01 POSITIVE PREGNANCY TEST: ICD-10-CM

## 2023-10-12 DIAGNOSIS — O09.521 MULTIGRAVIDA OF ADVANCED MATERNAL AGE IN FIRST TRIMESTER: ICD-10-CM

## 2023-10-12 DIAGNOSIS — Z3A.10 10 WEEKS GESTATION OF PREGNANCY: Primary | ICD-10-CM

## 2023-10-12 DIAGNOSIS — O21.1 HYPEREMESIS GRAVIDARUM BEFORE END OF 22 WEEK GESTATION WITH DEHYDRATION: ICD-10-CM

## 2023-10-12 DIAGNOSIS — O21.9 NAUSEA AND VOMITING DURING PREGNANCY: ICD-10-CM

## 2023-10-12 DIAGNOSIS — Z3A.08 8 WEEKS GESTATION OF PREGNANCY: ICD-10-CM

## 2023-10-12 LAB
ABO + RH BLD: NORMAL
BASOPHILS # BLD: <0.03 K/UL (ref 0–0.2)
BASOPHILS NFR BLD: 0 % (ref 0–2)
BLOOD GROUP ANTIBODIES SERPL: NEGATIVE
EOSINOPHIL # BLD: 0.03 K/UL (ref 0–0.44)
EOSINOPHILS RELATIVE PERCENT: 0 % (ref 1–4)
ERYTHROCYTE [DISTWIDTH] IN BLOOD BY AUTOMATED COUNT: 11.3 % (ref 11.8–14.4)
HBV SURFACE AG SERPL QL IA: NONREACTIVE
HCT VFR BLD AUTO: 42.5 % (ref 36.3–47.1)
HCV AB SERPL QL IA: NONREACTIVE
HGB BLD-MCNC: 14.3 G/DL (ref 11.9–15.1)
HIV 1+2 AB+HIV1 P24 AG SERPL QL IA: NONREACTIVE
IMM GRANULOCYTES # BLD AUTO: 0.03 K/UL (ref 0–0.3)
IMM GRANULOCYTES NFR BLD: 0 %
LYMPHOCYTES NFR BLD: 1.6 K/UL (ref 1.1–3.7)
LYMPHOCYTES RELATIVE PERCENT: 24 % (ref 24–43)
MCH RBC QN AUTO: 29.1 PG (ref 25.2–33.5)
MCHC RBC AUTO-ENTMCNC: 33.6 G/DL (ref 28.4–34.8)
MCV RBC AUTO: 86.4 FL (ref 82.6–102.9)
MONOCYTES NFR BLD: 0.34 K/UL (ref 0.1–1.2)
MONOCYTES NFR BLD: 5 % (ref 3–12)
NEUTROPHILS NFR BLD: 71 % (ref 36–65)
NEUTS SEG NFR BLD: 4.7 K/UL (ref 1.5–8.1)
NRBC BLD-RTO: 0 PER 100 WBC
PLATELET # BLD AUTO: 242 K/UL (ref 138–453)
PMV BLD AUTO: 11.4 FL (ref 8.1–13.5)
RBC # BLD AUTO: 4.92 M/UL (ref 3.95–5.11)
RUBV IGG SERPL QL IA: 147.5 IU/ML
T PALLIDUM AB SER QL IA: NONREACTIVE
WBC OTHER # BLD: 6.7 K/UL (ref 3.5–11.3)

## 2023-10-12 RX ORDER — METOCLOPRAMIDE 10 MG/1
10 TABLET ORAL
Qty: 42 TABLET | Refills: 0 | Status: SHIPPED | OUTPATIENT
Start: 2023-10-12 | End: 2023-10-26

## 2023-10-12 RX ORDER — POLYETHYLENE GLYCOL 3350 17 G/17G
17 POWDER, FOR SOLUTION ORAL DAILY PRN
COMMUNITY

## 2023-10-12 RX ORDER — ASPIRIN 81 MG/1
81 TABLET, CHEWABLE ORAL DAILY
Qty: 90 TABLET | Refills: 0 | Status: SHIPPED | OUTPATIENT
Start: 2023-10-12 | End: 2024-01-10

## 2023-10-12 NOTE — PROGRESS NOTES
Relationship with FOB: . Living together, 3rd pregnancy together, no other children  Partner's name:Carmelo  Plans to Breast fdg  Pain Score:0/10  Job title:  This is a planned pregnancy:Yes  Certain LMP:Yes  S/S of pregnancy:Yes missed menses and frequently needing to void  Hx N/V pregnancy:N/V persists with medications. Changing medication management and plans to try Reglan. Mother's ethnicity:    Father's ethnicity:        Patient Active Problem List   Diagnosis    Hx SAB x 1 (G1)    PLTCS 4/17/21 M Apg 8/9 Wt 8#6    Gestational HTN (G2)    Hyperemesis gravidarum before end of 22 week gestation with dehydration     Blood pressure 108/62, height 5' 7\" (1.702 m), weight 170 lb (77.1 kg), last menstrual period 08/02/2023, not currently breastfeeding. Toño Tang is a 28 y.o. [id number], here for her ACOG. The patients past medical, surgical, social and family history were reviewed. Current medications and allergies were reviewed, and documented in the chart. Menstrual history: regular  Birth control: IUD, BCP. Caro Pole POC vasectomy. Wt Readings from Last 3 Encounters:   10/12/23 170 lb (77.1 kg)   09/29/23 173 lb (78.5 kg)   09/29/23 174 lb (78.9 kg)     Recent Results (from the past 8736 hour(s))   GYN Cytology    Collection Time: 09/29/23 12:00 AM   Result Value Ref Range    Cytology Report       Path Number: [id number]    DIAGNOSIS    Imaged ThinPrep Pap - Cervical (1 monolayer slide):  Specimen Adequacy:       Satisfactory for evaluation.       -Endocervical/transformation zone component is absent. Descriptive Diagnosis:       Negative for intraepithelial lesion or malignancy. Shift in jorge suggestive of bacterial vaginosis. Comments:       Specimen was screened at [facility], [address] [place] [address].  [address] [place] [address],[id number]      [employer] Screener:  PT     **Electronically Signed Out**  Kayleigh Guthrie  pt/10/8/2023    Procedure/Addendum  HPV Procedure Report

## 2023-10-17 RX ORDER — DOXYLAMINE SUCCINATE 25 MG/1
25 TABLET ORAL
Qty: 30 TABLET | Refills: 0 | OUTPATIENT
Start: 2023-10-17

## 2023-10-23 ENCOUNTER — ROUTINE PRENATAL (OUTPATIENT)
Dept: OBGYN CLINIC | Age: 33
End: 2023-10-23

## 2023-10-23 VITALS
BODY MASS INDEX: 26.78 KG/M2 | WEIGHT: 171 LBS | DIASTOLIC BLOOD PRESSURE: 70 MMHG | HEART RATE: 83 BPM | SYSTOLIC BLOOD PRESSURE: 116 MMHG

## 2023-10-23 DIAGNOSIS — Z87.59 HISTORY OF GESTATIONAL HYPERTENSION: ICD-10-CM

## 2023-10-23 DIAGNOSIS — Z3A.11 11 WEEKS GESTATION OF PREGNANCY: ICD-10-CM

## 2023-10-23 DIAGNOSIS — O09.91 SUPERVISION OF HIGH RISK PREGNANCY IN FIRST TRIMESTER: ICD-10-CM

## 2023-10-23 DIAGNOSIS — O21.1 HYPEREMESIS GRAVIDARUM BEFORE END OF 22 WEEK GESTATION WITH DEHYDRATION: Primary | ICD-10-CM

## 2023-10-23 PROBLEM — O13.9 GESTATIONAL HYPERTENSION: Status: RESOLVED | Noted: 2021-04-18 | Resolved: 2023-10-23

## 2023-10-23 PROCEDURE — 0502F SUBSEQUENT PRENATAL CARE: CPT | Performed by: STUDENT IN AN ORGANIZED HEALTH CARE EDUCATION/TRAINING PROGRAM

## 2023-10-23 NOTE — PROGRESS NOTES
Prenatal Visit    Jourdan Tobias is a 28 y.o. female  at 11w5d    Subjective: The patient was seen and evaluated. Reports Negative fetal movements. She denies abdominal pain, vaginal bleeding and leakage of fluid. Signs and symptoms of  labor as well as labor were reviewed. Dates were reviewed with the patient. Estimated Date of Delivery: 24          The patient declined the influenza vaccine this year. The problem list reflects the active issues addressed during today's visit    VITALS:    BP: 116/70  Weight - Scale: 77.6 kg (171 lb)  Pulse: 83  Fetal HR: 159  Movement: Absent       Assessment & Plan:  Jourdan Tobias is a 28 y.o. female  at 7w9d   - An 18-22 week anatomy ultrasound has been ordered   - Declines genetic testing   - Continue taking Prenatal Vitamin.   - The ACIP recommended pregnant patients be included in phase 1C of vaccine distribution. This decision is supported by [facility] and ACOG. As of 2021, there have been over 30,000 pregnant patients included in the V-safe post COVID vaccination safety . Most (73%) reports to VAERS among pregnant women involved non-pregnancyspecific adverse events (e.g., local and systemic reactions). Miscarriage was the most frequently reported pregnancy-specific adverse event to VAERS; numbers are within the known background rates based on presumed COVID-19 vaccine doses administered to pregnant women. No unexpected pregnancy or infant outcomes have been observed related to  COVID-19 vaccination during pregnancy.  Recommended patient proceed with vaccination.    - Start ASA @ 12 weeks for Hx gHTN      Patient Active Problem List    Diagnosis Date Noted    PLTCS 21 M Apg 8/9 Wt 8#6 2021     Priority: High    Hx gHTN (G2) 10/23/2023     Baby ASA @ 12 weeks      Hyperemesis gravidarum before end of 22 week gestation with dehydration 2023     Will go to the ED for IVF unable to schedule with infusion center      Hx

## 2023-12-01 ENCOUNTER — ROUTINE PRENATAL (OUTPATIENT)
Dept: OBGYN CLINIC | Age: 33
End: 2023-12-01

## 2023-12-01 VITALS
HEART RATE: 84 BPM | DIASTOLIC BLOOD PRESSURE: 68 MMHG | WEIGHT: 184 LBS | SYSTOLIC BLOOD PRESSURE: 111 MMHG | BODY MASS INDEX: 28.82 KG/M2

## 2023-12-01 DIAGNOSIS — Z3A.17 17 WEEKS GESTATION OF PREGNANCY: Primary | ICD-10-CM

## 2023-12-01 DIAGNOSIS — O09.92 SUPERVISION OF HIGH RISK PREGNANCY IN SECOND TRIMESTER: ICD-10-CM

## 2023-12-01 NOTE — PROGRESS NOTES
Mark Larkin is a  @ 17w2d who presents for LU visit. She denies LOF, VB or Ctxs.  + FM. She denies any complaints. She denies any fevers/chills, SOB, cough, sore throat, loss of taste/smell or sick contacts. Pt denies any HA, vision changes or RUQ pain. O:  Vitals:    23 1254   BP: 111/68   Pulse: 84     Gen: NAD  Abd: soft, nontender, gravid  Ext:  no edema      BP: 111/68  Weight - Scale: 83.5 kg (184 lb)  Pulse: 84  Patient Position: Sitting  Fetal HR: 150  Movement: Present    A/P:  Patient Active Problem List    Diagnosis Date Noted    PLTCS 21 M Apg 8/9 Wt 8#6 2021     Priority: High    Hx gHTN (G2) 10/23/2023     Baby ASA @ 12 weeks      Hyperemesis gravidarum before end of 22 week gestation with dehydration 2023     Will go to the ED for IVF unable to schedule with infusion center      Hx SAB x 1 (G1) 10/20/2020     Discussed updated COVID precautions and policies. Reviewed updated visitor policy. Encouraged social distancing and appropriate hand washing/hygiene practices. Reviewed symptoms suspicious for COVID infection. Discussed that ACOG, SMFM, and the CDC recommend to not withold immunization in pregnant and breastfeeding women who meet criteria for receipt of the vaccine based on the ACIP recommended priority groups. All questions answered. Patient vocalized understanding.     Discussed s/sx that should prompt call to the office  Discussed kick counts  Pt counseled to continue PNVs  RTC in 4 wks    Arjun Kilgore MD

## 2024-01-03 ENCOUNTER — ROUTINE PRENATAL (OUTPATIENT)
Dept: OBGYN CLINIC | Age: 34
End: 2024-01-03

## 2024-01-03 VITALS
SYSTOLIC BLOOD PRESSURE: 113 MMHG | WEIGHT: 192 LBS | BODY MASS INDEX: 30.07 KG/M2 | DIASTOLIC BLOOD PRESSURE: 68 MMHG | HEART RATE: 85 BPM

## 2024-01-03 DIAGNOSIS — O09.92 HIGH-RISK PREGNANCY IN SECOND TRIMESTER: Primary | ICD-10-CM

## 2024-01-03 DIAGNOSIS — Z3A.22 22 WEEKS GESTATION OF PREGNANCY: ICD-10-CM

## 2024-01-03 PROCEDURE — 0502F SUBSEQUENT PRENATAL CARE: CPT | Performed by: STUDENT IN AN ORGANIZED HEALTH CARE EDUCATION/TRAINING PROGRAM

## 2024-01-03 NOTE — PROGRESS NOTES
Prenatal Visit    Tisha Tracey is a 33 y.o. female  at 22w0d IUP    The patient was seen and evaluated. She is taking her prenatal vitamins daily. She is having some numbness and tingling in her wrists that occurs through out the day and is worse at night.  Reports positive fetal movements. She denies headache, vision changes, RUQ pain, contractions, vaginal bleeding and leakage of fluid.     The problem list reflects the active issues addressed during today's visit    Vitals:     BP: 113/68  Weight - Scale: 87.1 kg (192 lb)  Pulse: 85  Patient Position: Sitting  Fundal Height (cm): 22 cm  Fetal HR: 145     PHYSICAL:   General appearance: no apparent distress, alert and cooperative  HEENT: head atraumatic, normocephalic, trachea midline, moist mucous membranes   Neurologic: alert, oriented, normal speech   Lungs: no increased work of breathing,   Abdomen: soft, gravid, non-tender on palpation    Musculoskeletal: no gross abnormalities, range of motion appropriate for age   Extremities: no swelling seen on upper extremities   Psychiatric: mood appropriate, normal affect      Assessment & Plan:  Tisha Tracey is a 33 y.o. female  at 22w0d IUP   - VSS       - The patients anatomy ultrasound has been completed and reviewed with patient.    - Next [facility] appointment 2/15/24   - Continue taking prenatal vitamins QD    - Rhogam not indicated   - Influenza vaccination: due this season    - S/p COVID-19 vaccination x1  - Discussed repeat  vs TOLAC. She is leaning towards repeat  at this time   - Discussed wearing wrist splints to help with carpal tunnel symptoms. Wrist exercises shown to her to help relieve her discomfort. Discussed how if there is no improvement, can refer to physical therapy or orthopedic surgery   Return in about 4 weeks (around 2024) for LU.          Patient Active Problem List    Diagnosis Date Noted    PLTCS 21 M Apg 8/ Wt 8#6 2021     Priority: High

## 2024-01-24 ENCOUNTER — ROUTINE PRENATAL (OUTPATIENT)
Dept: OBGYN CLINIC | Age: 34
End: 2024-01-24

## 2024-01-24 VITALS
HEART RATE: 100 BPM | BODY MASS INDEX: 31.64 KG/M2 | SYSTOLIC BLOOD PRESSURE: 114 MMHG | WEIGHT: 202 LBS | DIASTOLIC BLOOD PRESSURE: 75 MMHG

## 2024-01-24 DIAGNOSIS — O21.9 NAUSEA AND VOMITING IN PREGNANCY: ICD-10-CM

## 2024-01-24 DIAGNOSIS — Z3A.25 25 WEEKS GESTATION OF PREGNANCY: ICD-10-CM

## 2024-01-24 DIAGNOSIS — O09.92 HIGH-RISK PREGNANCY IN SECOND TRIMESTER: Primary | ICD-10-CM

## 2024-01-24 PROCEDURE — 0502F SUBSEQUENT PRENATAL CARE: CPT | Performed by: STUDENT IN AN ORGANIZED HEALTH CARE EDUCATION/TRAINING PROGRAM

## 2024-01-24 NOTE — PROGRESS NOTES
Prenatal Visit    Tisha Tracey is a 33 y.o. female  at 25w0d IUP    The patient was seen and evaluated. She has no complaints today. States she is having some trouble with sleeping and requesting refill on  doxylamine. She has a pregnancy pillow but it is not very comfortable. She is worried that she gained ten lbs since her last appointment. Reports positive fetal movements. She denies headache, vision changes, RUQ pain, contractions, vaginal bleeding and leakage of fluid.     The problem list reflects the active issues addressed during today's visit    Vitals:    BP: 114/75  Weight - Scale: 91.6 kg (202 lb)  Pulse: 100  Patient Position: Sitting  Fundal Height (cm): 25 cm  Fetal HR: 138     PHYSICAL:   General appearance: no apparent distress, alert and cooperative  HEENT: head atraumatic, normocephalic, trachea midline, moist mucous membranes   Neurologic: alert, oriented, normal speech   Lungs: no increased work of breathing,   Abdomen: soft, gravid, non-tender on palpation    Musculoskeletal: no gross abnormalities, range of motion appropriate for age   Psychiatric: mood appropriate, normal affect      Assessment & Plan:  Tisha Tracey is a 33 y.o. female  at 25w0d IUP   - VSS     - 28 week labs ordered    - The patients anatomy ultrasound has been completed and reviewed with patient.    - Next [facility] appointment 2/15/24   - Continue taking prenatal vitamins QD & baby aspirin 81 mg QD               - Rhogam not indicated   - Influenza vaccination: due this season               - S/p COVID-19 vaccination x1   - TDAP next visit    - Doxylamine refilled . Can use her normal pillow and can stack pillows between her legs and under her abdomen if that is more comfortable    - Discussed weight gain can be skewed based off of clothing, what she ate, time of day etc. Discussed weighing herself weekly at home, naked in the morning for a more accurate representation of weight gain. She reports she does not

## 2024-02-14 ENCOUNTER — ROUTINE PRENATAL (OUTPATIENT)
Dept: OBGYN CLINIC | Age: 34
End: 2024-02-14

## 2024-02-14 VITALS
DIASTOLIC BLOOD PRESSURE: 77 MMHG | BODY MASS INDEX: 32.42 KG/M2 | WEIGHT: 207 LBS | SYSTOLIC BLOOD PRESSURE: 128 MMHG | HEART RATE: 95 BPM

## 2024-02-14 DIAGNOSIS — Z3A.28 28 WEEKS GESTATION OF PREGNANCY: Primary | ICD-10-CM

## 2024-02-14 DIAGNOSIS — O09.93 SUPERVISION OF HIGH RISK PREGNANCY IN THIRD TRIMESTER: ICD-10-CM

## 2024-02-14 PROCEDURE — 0502F SUBSEQUENT PRENATAL CARE: CPT | Performed by: OBSTETRICS & GYNECOLOGY

## 2024-02-14 NOTE — PROGRESS NOTES
Tisha is a  @ 28w0d who presents for LU visit.  She denies LOF, VB or Ctxs.  + FM.  She is taking unisom and that is helping her sleep much better.  She denies any fevers/chills, SOB, cough, sore throat, loss of taste/smell or sick contacts.  Pt denies any HA, vision changes or RUQ pain.     O:  Vitals:    24 1341   BP: 128/77   Pulse: 95     Gen: NAD  Abd: soft, nontender, gravid  Ext:  no edema      BP: 128/77  Weight - Scale: 93.9 kg (207 lb)  Pulse: 95  Patient Position: Sitting  Fundal Height (cm): 28 cm  Fetal HR: 155  Movement: Present    A/P:  Patient Active Problem List    Diagnosis Date Noted    PLTCS 21 M Apg 8/9 Wt 8#6 2021     Priority: High    Hx gHTN (G2) 10/23/2023     Baby ASA @ 12 weeks      Hyperemesis gravidarum before end of 22 week gestation with dehydration 2023     Will go to the ED for IVF unable to schedule with infusion center      Hx SAB x 1 (G1) 10/20/2020     - Discussed updated COVID precautions and policies. Reviewed updated visitor policy. Encouraged social distancing and appropriate hand washing/hygiene practices. Reviewed symptoms suspicious for COVID infection. Discussed that ACOG, SMFM, and the CDC recommend to not withold immunization in pregnant and breastfeeding women who meet criteria for receipt of the vaccine based on the ACIP recommended priority groups. All questions answered. Patient vocalized understanding.    - RSV vaccination (32-36 weeks): The patient was counseled on benefits to her baby if she receives the Pfizer RSV vaccine (Abrysvo) during pregnancy. She was informed that this vaccination is FDA approved for use during pregnancy. She will think about it and call local pharmacies       Encouraged pt to do 1 hr GTT, thinking she will do tomorrow  Discussed s/sx that should prompt call to the office  Discussed kick counts  Pt counseled to continue PNVs  RTC in 2 wks    Milvia Huang MD

## 2024-02-15 ENCOUNTER — ROUTINE PRENATAL (OUTPATIENT)
Dept: PERINATAL CARE | Age: 34
End: 2024-02-15
Payer: COMMERCIAL

## 2024-02-15 VITALS
SYSTOLIC BLOOD PRESSURE: 112 MMHG | DIASTOLIC BLOOD PRESSURE: 64 MMHG | RESPIRATION RATE: 16 BRPM | HEART RATE: 90 BPM | BODY MASS INDEX: 32.33 KG/M2 | HEIGHT: 67 IN | TEMPERATURE: 98.6 F | WEIGHT: 206 LBS

## 2024-02-15 DIAGNOSIS — O09.523 MULTIGRAVIDA OF ADVANCED MATERNAL AGE IN THIRD TRIMESTER: Primary | ICD-10-CM

## 2024-02-15 DIAGNOSIS — Z36.4 ULTRASOUND FOR ANTENATAL SCREENING FOR FETAL GROWTH RESTRICTION: ICD-10-CM

## 2024-02-15 DIAGNOSIS — Z3A.28 28 WEEKS GESTATION OF PREGNANCY: ICD-10-CM

## 2024-02-15 DIAGNOSIS — Z13.89 ENCOUNTER FOR ROUTINE SCREENING FOR MALFORMATION USING ULTRASONICS: ICD-10-CM

## 2024-02-15 DIAGNOSIS — Z87.59 HISTORY OF GESTATIONAL HYPERTENSION: ICD-10-CM

## 2024-02-15 DIAGNOSIS — O35.2XX0 HEREDITARY FAMILIAL DISEASE AFFECTING MANAGEMENT OF MOTHER AND POSSIBLY AFFECTING FETUS, ANTEPARTUM, SINGLE OR UNSPECIFIED FETUS: ICD-10-CM

## 2024-02-15 PROCEDURE — 76805 OB US >/= 14 WKS SNGL FETUS: CPT | Performed by: OBSTETRICS & GYNECOLOGY

## 2024-02-15 PROCEDURE — 76819 FETAL BIOPHYS PROFIL W/O NST: CPT | Performed by: OBSTETRICS & GYNECOLOGY

## 2024-02-15 NOTE — PROGRESS NOTES
Patient declined both invasive prenatal diagnostic testing and non-invasive prenatal testing (NIPT/NIPS) today.     Patient previously declined invasive prenatal diagnostic testing (including for evaluation and testing for fetal aneuploidy, fetal microdeletions, fetal single gene disorders, fetal microarray testing, etc).     Patient previously opted for non-invasive prenatal diagnosis testing (with [facility]) - now declines.  Patient previously opted for maternal carrier testing ([facility] Carrier Screen) - now declines.      Please refer to Maternal-Fetal Medicine OBGYN resident progress note in EPIC.

## 2024-02-15 NOTE — PROGRESS NOTES
Obstetric/Gynecology Maternal Fetal Medicine Resident Note    Patient is now declining previously requested maternal genetic carrier screening and noninvasive prenatal testing.       Gabbi Fofana DO  OBGYN Resident, PGY2  [facility]  2/15/2024, 10:19 AM       yes

## 2024-02-22 DIAGNOSIS — O21.9 NAUSEA AND VOMITING IN PREGNANCY: ICD-10-CM

## 2024-02-23 RX ORDER — DOXYLAMINE SUCCINATE 25 MG/1
25 TABLET ORAL NIGHTLY
Qty: 30 TABLET | Refills: 0 | Status: SHIPPED | OUTPATIENT
Start: 2024-02-23

## 2024-02-29 NOTE — PROGRESS NOTES
Tisha is a  @ 30w2d who presents for LU visit.  She denies LOF, VB or Ctxs.  + FM.  She denies any complaints at this time.  She hasn't been able to do her glucose test yet, but says she will do it this weekend.  She denies any fevers/chills, SOB, cough, sore throat, loss of taste/smell or sick contacts.  Pt denies any HA, vision changes or RUQ pain.     O:  Vitals:    24 0909   BP: 128/70   Pulse: 97     Gen: NAD  Abd: soft, nontender, gravid  Ext:  no edema      BP: 128/70  Weight - Scale: 94.3 kg (208 lb)  Pulse: 97  Patient Position: Sitting  Fundal Height (cm): 30 cm  Fetal HR: 140  Movement: Present    A/P:  Patient Active Problem List    Diagnosis Date Noted    PLTCS 21 M Apg 8/9 Wt 8#6 2021     Priority: High    Hx gHTN (G2) 10/23/2023     Baby ASA @ 12 weeks      Hyperemesis gravidarum before end of 22 week gestation with dehydration 2023     Will go to the ED for IVF unable to schedule with infusion center      Hx SAB x 1 (G1) 10/20/2020     - Discussed updated COVID precautions and policies. Reviewed updated visitor policy. Encouraged social distancing and appropriate hand washing/hygiene practices. Reviewed symptoms suspicious for COVID infection. Discussed that ACOG, SMFM, and the CDC recommend to not withold immunization in pregnant and breastfeeding women who meet criteria for receipt of the vaccine based on the ACIP recommended priority groups. All questions answered. Patient vocalized understanding.    - RSV vaccination (32-36 weeks): The patient was counseled on benefits to her baby if she receives the Pfizer RSV vaccine (Abrysvo) during pregnancy. She was informed that this vaccination is FDA approved for use during pregnancy. She will think about it and call local pharmacies       Pt is going to do her glucose test this week  Will discuss Tdap at next visit  Discussed s/sx that should prompt call to the office  Discussed kick counts  Pt counseled to continue PNVs  RTC

## 2024-03-01 ENCOUNTER — ROUTINE PRENATAL (OUTPATIENT)
Dept: OBGYN CLINIC | Age: 34
End: 2024-03-01

## 2024-03-01 VITALS
WEIGHT: 208 LBS | BODY MASS INDEX: 32.58 KG/M2 | HEART RATE: 97 BPM | DIASTOLIC BLOOD PRESSURE: 70 MMHG | SYSTOLIC BLOOD PRESSURE: 128 MMHG

## 2024-03-01 DIAGNOSIS — Z3A.30 30 WEEKS GESTATION OF PREGNANCY: Primary | ICD-10-CM

## 2024-03-01 DIAGNOSIS — O09.93 SUPERVISION OF HIGH RISK PREGNANCY IN THIRD TRIMESTER: ICD-10-CM

## 2024-03-01 PROCEDURE — 0502F SUBSEQUENT PRENATAL CARE: CPT | Performed by: OBSTETRICS & GYNECOLOGY

## 2024-03-14 NOTE — PROGRESS NOTES
Tisha is a  @ 32w2d who presents for LU visit.  She denies LOF, VB or Ctxs.  + FM.  She says she never felt this much movement with her last baby.  She is feeling a bit more tired.  She denies any fevers/chills, SOB, cough, sore throat, loss of taste/smell or sick contacts.  Pt denies any HA, vision changes or RUQ pain.     O:  Vitals:    03/15/24 1025   BP: 128/74   Pulse: 98     Gen: NAD  Abd: soft, nontender, gravid  Ext:  no edema      BP: 128/74  Weight - Scale: 96.2 kg (212 lb)  Pulse: 98  Patient Position: Sitting  Fundal Height (cm): 32 cm  Fetal HR: 140  Movement: Present    A/P:  Patient Active Problem List    Diagnosis Date Noted    PLTCS 21 M Apg 8/9 Wt 8#6 2021     Priority: High    Hx gHTN (G2) 10/23/2023     Baby ASA @ 12 weeks      Hyperemesis gravidarum before end of 22 week gestation with dehydration 2023     Will go to the ED for IVF unable to schedule with infusion center      Hx SAB x 1 (G1) 10/20/2020     - Discussed updated COVID precautions and policies. Reviewed updated visitor policy. Encouraged social distancing and appropriate hand washing/hygiene practices. Reviewed symptoms suspicious for COVID infection. Discussed that ACOG, SMFM, and the CDC recommend to not withold immunization in pregnant and breastfeeding women who meet criteria for receipt of the vaccine based on the ACIP recommended priority groups. All questions answered. Patient vocalized understanding.    - RSV vaccination (32-36 weeks): The patient was counseled on benefits to her baby if she receives the Pfizer RSV vaccine (Abrysvo) during pregnancy. She was informed that this vaccination is FDA approved for use during pregnancy. She will think about it and call local pharmacies       Pt is thinking she would like c/s /3.    Discussed s/sx that should prompt call to the office  Discussed kick counts  Pt counseled to continue PNVs  RTC in 2 wks    Milvia Huang MD

## 2024-03-15 ENCOUNTER — ROUTINE PRENATAL (OUTPATIENT)
Dept: OBGYN CLINIC | Age: 34
End: 2024-03-15

## 2024-03-15 VITALS
HEART RATE: 98 BPM | WEIGHT: 212 LBS | BODY MASS INDEX: 33.2 KG/M2 | DIASTOLIC BLOOD PRESSURE: 74 MMHG | SYSTOLIC BLOOD PRESSURE: 128 MMHG

## 2024-03-15 DIAGNOSIS — Z3A.32 32 WEEKS GESTATION OF PREGNANCY: Primary | ICD-10-CM

## 2024-03-15 DIAGNOSIS — O09.93 SUPERVISION OF HIGH RISK PREGNANCY IN THIRD TRIMESTER: ICD-10-CM

## 2024-03-18 ENCOUNTER — TELEPHONE (OUTPATIENT)
Dept: OBGYN CLINIC | Age: 34
End: 2024-03-18

## 2024-03-18 NOTE — TELEPHONE ENCOUNTER
Attempted to call, csection scheduled for 5/3/2024 at 12:30, patient will need to be at the hospital at 10:30am that morning. We will also give patient information to prep for csection at visit closer to date

## 2024-03-18 NOTE — TELEPHONE ENCOUNTER
OB 32.5wk contacted pt to let her know that we are unable to see your 28wk labs completed in her chart. Asked if she went to another location.    Pt states she has not been able to go because she has been having childcare issues. Pt states she'll have it completed by 03/20/24.

## 2024-03-20 ENCOUNTER — HOSPITAL ENCOUNTER (OUTPATIENT)
Age: 34
Discharge: HOME | End: 2024-03-20
Payer: COMMERCIAL

## 2024-03-20 DIAGNOSIS — Z3A.25 25 WEEKS GESTATION OF PREGNANCY: ICD-10-CM

## 2024-03-20 DIAGNOSIS — O09.92 HIGH-RISK PREGNANCY IN SECOND TRIMESTER: ICD-10-CM

## 2024-03-20 LAB
BASOPHILS # BLD: 0 K/UL (ref 0–0.2)
BASOPHILS NFR BLD: 0 % (ref 0–2)
EOSINOPHIL # BLD: 0.1 K/UL (ref 0–0.4)
EOSINOPHILS RELATIVE PERCENT: 1 % (ref 0–4)
ERYTHROCYTE [DISTWIDTH] IN BLOOD BY AUTOMATED COUNT: 12.5 % (ref 11.5–14.9)
GLUCOSE 1H P 50 G GLC PO SERPL-MCNC: 123 MG/DL (ref 70–135)
GLUCOSE ADMINISTRATION: NORMAL
HCT VFR BLD AUTO: 32.8 % (ref 36–46)
HGB BLD-MCNC: 11 G/DL (ref 12–16)
LYMPHOCYTES NFR BLD: 1.4 K/UL (ref 1–4.8)
LYMPHOCYTES RELATIVE PERCENT: 11 % (ref 24–44)
MCH RBC QN AUTO: 29.3 PG (ref 26–34)
MCHC RBC AUTO-ENTMCNC: 33.5 G/DL (ref 31–37)
MCV RBC AUTO: 87.4 FL (ref 80–100)
MONOCYTES NFR BLD: 0.5 K/UL (ref 0.1–1.3)
MONOCYTES NFR BLD: 4 % (ref 1–7)
NEUTROPHILS NFR BLD: 84 % (ref 36–66)
NEUTS SEG NFR BLD: 10.8 K/UL (ref 1.3–9.1)
PLATELET # BLD AUTO: 201 K/UL (ref 150–450)
PMV BLD AUTO: 9 FL (ref 6–12)
RBC # BLD AUTO: 3.76 M/UL (ref 4–5.2)
WBC OTHER # BLD: 12.9 K/UL (ref 3.5–11)

## 2024-03-20 PROCEDURE — 87086 URINE CULTURE/COLONY COUNT: CPT

## 2024-03-20 PROCEDURE — 82950 GLUCOSE TEST: CPT

## 2024-03-20 PROCEDURE — 36415 COLL VENOUS BLD VENIPUNCTURE: CPT

## 2024-03-20 PROCEDURE — 85025 COMPLETE CBC W/AUTO DIFF WBC: CPT

## 2024-03-21 LAB
MICROORGANISM SPEC CULT: NORMAL
SPECIMEN DESCRIPTION: NORMAL

## 2024-03-26 DIAGNOSIS — O21.9 NAUSEA AND VOMITING IN PREGNANCY: ICD-10-CM

## 2024-03-28 NOTE — PROGRESS NOTES
Tisha is a  @ 34w2d who presents for LU visit.  She denies LOF, VB or Ctxs.  + FM.  She is needing to use unisom for sleep every night, but that does work for her.  She denies any fevers/chills, SOB, cough, sore throat, loss of taste/smell or sick contacts.  Pt denies any HA, vision changes or RUQ pain.     O:  Vitals:    24 0918   BP: 129/80   Pulse: 74     Gen: NAD  Abd: soft, nontender, gravid  Ext:  no edema      BP: 129/80  Weight - Scale: 97.5 kg (215 lb)  Pulse: 74  Patient Position: Sitting  Fundal Height (cm): 33 cm  Fetal HR: 145  Movement: Present    A/P:  Patient Active Problem List    Diagnosis Date Noted    PLTCS 21 M Apg 8/9 Wt 8#6 2021     Priority: High    Hx gHTN (G2) 10/23/2023     Baby ASA @ 12 weeks      Hyperemesis gravidarum before end of 22 week gestation with dehydration 2023     Will go to the ED for IVF unable to schedule with infusion center      Hx SAB x 1 (G1) 10/20/2020     - Discussed updated COVID precautions and policies. Reviewed updated visitor policy. Encouraged social distancing and appropriate hand washing/hygiene practices. Reviewed symptoms suspicious for COVID infection. Discussed that ACOG, SMFM, and the CDC recommend to not withold immunization in pregnant and breastfeeding women who meet criteria for receipt of the vaccine based on the ACIP recommended priority groups. All questions answered. Patient vocalized understanding.    - RSV vaccination (32-36 weeks): The patient was counseled on benefits to her baby if she receives the Pfizer RSV vaccine (Abrysvo) during pregnancy. She was informed that this vaccination is FDA approved for use during pregnancy. She will think about it and call local pharmacies       GBS next visit  Discussed s/sx that should prompt call to the office  Discussed kick counts  Pt counseled to continue PNVs  RTC in 2 wks    Milvia Huang MD

## 2024-03-29 ENCOUNTER — ROUTINE PRENATAL (OUTPATIENT)
Dept: OBGYN CLINIC | Age: 34
End: 2024-03-29

## 2024-03-29 VITALS
SYSTOLIC BLOOD PRESSURE: 129 MMHG | DIASTOLIC BLOOD PRESSURE: 80 MMHG | BODY MASS INDEX: 33.67 KG/M2 | HEART RATE: 74 BPM | WEIGHT: 215 LBS

## 2024-03-29 DIAGNOSIS — Z3A.34 34 WEEKS GESTATION OF PREGNANCY: Primary | ICD-10-CM

## 2024-03-29 DIAGNOSIS — O09.93 SUPERVISION OF HIGH RISK PREGNANCY IN THIRD TRIMESTER: ICD-10-CM

## 2024-03-29 PROCEDURE — 0502F SUBSEQUENT PRENATAL CARE: CPT | Performed by: OBSTETRICS & GYNECOLOGY

## 2024-04-17 ENCOUNTER — HOSPITAL ENCOUNTER (OUTPATIENT)
Age: 34
Setting detail: SPECIMEN
Discharge: HOME | End: 2024-04-17

## 2024-04-17 ENCOUNTER — ROUTINE PRENATAL (OUTPATIENT)
Dept: OBGYN CLINIC | Age: 34
End: 2024-04-17

## 2024-04-17 VITALS
HEART RATE: 104 BPM | WEIGHT: 216 LBS | SYSTOLIC BLOOD PRESSURE: 120 MMHG | BODY MASS INDEX: 33.83 KG/M2 | DIASTOLIC BLOOD PRESSURE: 79 MMHG

## 2024-04-17 DIAGNOSIS — O09.93 SUPERVISION OF HIGH RISK PREGNANCY IN THIRD TRIMESTER: ICD-10-CM

## 2024-04-17 DIAGNOSIS — Z3A.37 37 WEEKS GESTATION OF PREGNANCY: ICD-10-CM

## 2024-04-17 DIAGNOSIS — Z3A.37 37 WEEKS GESTATION OF PREGNANCY: Primary | ICD-10-CM

## 2024-04-17 DIAGNOSIS — O21.9 NAUSEA AND VOMITING IN PREGNANCY: ICD-10-CM

## 2024-04-17 PROCEDURE — 0502F SUBSEQUENT PRENATAL CARE: CPT | Performed by: OBSTETRICS & GYNECOLOGY

## 2024-04-17 NOTE — PROGRESS NOTES
Tisha is a  @ 37w0d who presents for LU visit.  She denies LOF, VB or Ctxs.  + FM.  She has had difficulty sleeping and misplaced her unisom.  She denies any fevers/chills, SOB, cough, sore throat, loss of taste/smell or sick contacts.  Pt denies any HA, vision changes or RUQ pain.     O:  Vitals:    24 1351   BP: 120/79   Pulse: (!) 104     Gen: NAD  Abd: soft, nontender, gravid  Ext:  no edema      BP: 120/79  Weight - Scale: 98 kg (216 lb)  Pulse: (!) 104  Patient Position: Sitting  Fundal Height (cm): 37 cm  Fetal HR: 130  Movement: Present    A/P:  Patient Active Problem List    Diagnosis Date Noted    PLTCS 21 M Apg 8/9 Wt 8#6 2021     Priority: High    Hx gHTN (G2) 10/23/2023     Baby ASA @ 12 weeks      Hyperemesis gravidarum before end of 22 week gestation with dehydration 2023     Will go to the ED for IVF unable to schedule with infusion center      Hx SAB x 1 (G1) 10/20/2020     - Discussed updated COVID precautions and policies. Reviewed updated visitor policy. Encouraged social distancing and appropriate hand washing/hygiene practices. Reviewed symptoms suspicious for COVID infection. Discussed that ACOG, SMFM, and the CDC recommend to not withold immunization in pregnant and breastfeeding women who meet criteria for receipt of the vaccine based on the ACIP recommended priority groups. All questions answered. Patient vocalized understanding.    - RSV vaccination (32-36 weeks): The patient was counseled on benefits to her baby if she receives the Pfizer RSV vaccine (Abrysvo) during pregnancy. She was informed that this vaccination is FDA approved for use during pregnancy. She will think about it and call local pharmacies       GBS swab obtained  Rx for unisom refilled  Soap & ERAS instructions given  Discussed s/sx that should prompt call to the office  Discussed kick counts  Pt counseled to continue PNVs  RTC in 1 wks    Milvia Huang MD

## 2024-04-19 PROBLEM — O99.820 GBS (GROUP B STREPTOCOCCUS CARRIER), +RV CULTURE, CURRENTLY PREGNANT: Status: ACTIVE | Noted: 2024-04-19

## 2024-04-19 LAB
MICROORGANISM SPEC CULT: ABNORMAL
SPECIMEN DESCRIPTION: ABNORMAL

## 2024-04-19 NOTE — RESULT ENCOUNTER NOTE
Her gbs was positive.  She will need antibiotics in labor and we can discuss it at her next appointment

## 2024-04-24 ENCOUNTER — ROUTINE PRENATAL (OUTPATIENT)
Dept: OBGYN CLINIC | Age: 34
End: 2024-04-24

## 2024-04-24 VITALS
HEART RATE: 89 BPM | HEIGHT: 67 IN | DIASTOLIC BLOOD PRESSURE: 74 MMHG | BODY MASS INDEX: 34.34 KG/M2 | SYSTOLIC BLOOD PRESSURE: 121 MMHG | WEIGHT: 218.8 LBS

## 2024-04-24 DIAGNOSIS — Z3A.38 38 WEEKS GESTATION OF PREGNANCY: ICD-10-CM

## 2024-04-24 DIAGNOSIS — O09.93 HIGH-RISK PREGNANCY IN THIRD TRIMESTER: Primary | ICD-10-CM

## 2024-04-24 PROCEDURE — 0502F SUBSEQUENT PRENATAL CARE: CPT | Performed by: STUDENT IN AN ORGANIZED HEALTH CARE EDUCATION/TRAINING PROGRAM

## 2024-04-24 NOTE — PROGRESS NOTES
Prenatal Visit    Tisha Tracey is a 34 y.o. female  at 38w0d IUP    The patient was seen and evaluated. She has no complaints today. There was positive fetal movements. She denies contractions, vaginal bleeding and leakage of fluid. She currently denies any signs or symptoms of pre-eclampsia which include headache, vision changes, RUQ pain. Signs and symptoms of labor were reviewed.     Allergies:  Latex    Vitals:     BP: 121/74  Weight - Scale: 99.2 kg (218 lb 12.8 oz)  Pulse: 89  Fundal Height (cm): 38 cm  Fetal HR: 140     Physical:   General appearance: no apparent distress, alert and cooperative  HEENT: head atraumatic, normocephalic, trachea midline, moist mucous membranes   Neurologic: alert, oriented, normal speech   Lungs: no increased work of breathing,   Abdomen: soft, gravid, non-tender on palpation    Musculoskeletal: no gross abnormalities, range of motion appropriate for age   Psychiatric: mood appropriate, normal affect      Assessment & Plan:  Tisha Tracey is a 34 y.o. female  at 38w0d IUP   - VSS     - GBS positive 24                 - Continue taking prenatal vitamins QD & baby aspirin 81 mg QD               - Rhogam not indicated   - Influenza vaccination: due this season               - S/p COVID-19 vaccination x1              - TDAP: declined this pregnancy     - F/u as clinically indicated with M office    - Reviewed the indications for her primary c section    -  scheduled for 5/3/24. She was previously given soap and ERAS instructions. Is aware to arrive two hrs early to L&D. Reviewed visitor policy  Return in about 1 week (around 2024) for LU.    Counseling:   - Warning signs of  labor, pre-eclampsia, decreased fetal movement and recommendations when to call or present to the hospital reviewed       No orders of the defined types were placed in this encounter.    Patient Active Problem List    Diagnosis Date Noted    PLTCS 21 M Apg 8/9 Wt

## 2024-04-29 NOTE — DISCHARGE SUMMARY
tablet  Commonly known as: B-6  Take 1 tablet by mouth in the morning and at bedtime               Where to Get Your Medications        These medications were sent to [facility] - [place], [place] - [address] - P [phone] - F [phone]  [facility] [place] [address]      Phone: [phone]   acetaminophen 500 MG tablet  ferrous sulfate 325 (65 Fe) MG tablet  ibuprofen 600 MG tablet  ondansetron 4 MG tablet  oxyCODONE 5 MG immediate release tablet  senna 8.6 MG tablet  simethicone 80 MG chewable tablet           Activity: pelvic rest x 6 weeks, no driving on narcotics, no lifting greater than 15 lbs  Diet: regular diet  Follow up: 1 week for Silver dressing removal    Condition on discharge: good    Discharge date: 05/05/24      Lizeth Galicia DO  Ob/Gyn Resident    Comments:  Home care and follow-up care were reviewed.  Pelvic rest, and birth control were reviewed. Signs and symptoms of mastitis and post partum depression were reviewed. The patient is to notify her physician if any of these occur. The patient was counseled on secondary smoke risks and the increased risk of sudden infant death syndrome and respiratory problems to her baby with exposure. She was counseled on various alternate recommendations to decrease the exposure to secondary smoke to her children.

## 2024-04-29 NOTE — H&P
2/15/2024 10/12/23 10/26/23  Ivonne Pringle APRN - CNP   aspirin (ASPIRIN CHILDRENS) 81 MG chewable tablet Take 1 tablet by mouth daily 10/12/23 2/15/24  Ivonne Pringle APRN - CNP   Prenatal Vit-Fe Fumarate-FA (PRENATAL PO) Take by mouth  Patient not taking: Reported on 4/24/2024    Provider, MD Cira   ondansetron (ZOFRAN-ODT) 4 MG disintegrating tablet Take 1 tablet by mouth every 8 hours as needed for Nausea or Vomiting  Patient not taking: Reported on 12/19/2023 9/25/23   Norah Barth DO   vitamin B-6 (B-6) 25 MG tablet Take 1 tablet by mouth in the morning and at bedtime  Patient not taking: Reported on 12/1/2023 9/19/23   Missy Veronica DO       FAMILY HISTORY:  family history includes Congenital Heart Defect in her father; Dementia in her maternal grandmother and paternal grandmother; Diabetes type 2  (age of onset: 58) in her father; Heart Surgery in her maternal grandmother; No Known Problems in her maternal grandfather and mother; Other in her sister; Pacemaker in her paternal grandfather; Pancreatic Cancer (age of onset: 58) in her father.    SOCIAL HISTORY:   reports that she has never smoked. She has never used smokeless tobacco. She reports that she does not currently use alcohol. She reports that she does not use drugs.    VITALS:     Vitals:    05/03/24 1042 05/03/24 1046   BP: 120/71    Pulse: 90    Resp: 16    Temp: 98.7 °F (37.1 °C)    TempSrc: Oral    SpO2: 96%    Weight:  98.9 kg (218 lb)   Height:  1.702 m (5' 7\")         PHYSICAL EXAM:  Fetal Heart Monitor:  Baseline Heart Rate 130, moderate variability, present accelerations, absent decelerations  [place]: contractions, irritability     General appearance:  no apparent distress alert and cooperative  HEENT: head atraumatic, normocephalic, moist mucous membranes, trachea midline  Neurologic:  alert, oriented, normal speech, no focal findings or movement disorder noted  Lungs:  No increased work of breathing, no

## 2024-05-01 ENCOUNTER — ROUTINE PRENATAL (OUTPATIENT)
Dept: OBGYN CLINIC | Age: 34
End: 2024-05-01

## 2024-05-01 VITALS
SYSTOLIC BLOOD PRESSURE: 110 MMHG | BODY MASS INDEX: 34.14 KG/M2 | WEIGHT: 218 LBS | HEART RATE: 95 BPM | DIASTOLIC BLOOD PRESSURE: 68 MMHG

## 2024-05-01 DIAGNOSIS — O09.93 HIGH-RISK PREGNANCY IN THIRD TRIMESTER: Primary | ICD-10-CM

## 2024-05-01 DIAGNOSIS — Z3A.39 39 WEEKS GESTATION OF PREGNANCY: ICD-10-CM

## 2024-05-01 PROCEDURE — 0502F SUBSEQUENT PRENATAL CARE: CPT | Performed by: STUDENT IN AN ORGANIZED HEALTH CARE EDUCATION/TRAINING PROGRAM

## 2024-05-01 NOTE — PROGRESS NOTES
Prenatal Visit    Tisha Tracey is a 34 y.o. female  at 39w0d IUP    The patient was seen and evaluated. She has no complaints today. There was positive fetal movements. She denies contractions, vaginal bleeding and leakage of fluid. She currently denies any signs or symptoms of pre-eclampsia which include headache, vision changes, RUQ pain. Signs and symptoms of labor were reviewed. Lost her ERAS instructions and requesting a new packet.     Allergies:  Latex    Vitals:  BP: 110/68  Weight - Scale: 98.9 kg (218 lb)  Pulse: 95  Patient Position: Sitting  Fundal Height (cm): 39 cm  Fetal HR: 130  Movement: Present     Physical:   General appearance: no apparent distress, alert and cooperative  HEENT: head atraumatic, normocephalic, trachea midline, moist mucous membranes   Neurologic: alert, oriented, normal speech   Lungs: no increased work of breathing,   Abdomen: soft, gravid, non-tender on palpation    Musculoskeletal: no gross abnormalities, range of motion appropriate for age   Psychiatric: mood appropriate, normal affect      Assessment & Plan:  Tisha Tracey is a 34 y.o. female  at 39w0d IUP   - VSS    - GBS positive 24                 - Continue taking prenatal vitamins QD & baby aspirin 81 mg QD               - Rhogam not indicated   - Influenza vaccination: due this season               - S/p COVID-19 vaccination x1              - TDAP: declined this pregnancy                - F/u as clinically indicated with M office                -  scheduled for 5/3/24. Is aware to arrive 2 hrs early. ERAS instructions given to her as she lost her previous packet.   Return in about 1 week (around 2024) for LU.    Counseling:   - Warning signs of  labor, pre-eclampsia, decreased fetal movement and recommendations when to call or present to the hospital reviewed    - The patient was counseled on the need to choose her pediatrician for her baby.     No orders of the defined types

## 2024-05-03 ENCOUNTER — ANESTHESIA (OUTPATIENT)
Dept: LABOR AND DELIVERY | Age: 34
End: 2024-05-03
Payer: COMMERCIAL

## 2024-05-03 ENCOUNTER — ANESTHESIA EVENT (OUTPATIENT)
Dept: LABOR AND DELIVERY | Age: 34
End: 2024-05-03
Payer: COMMERCIAL

## 2024-05-03 ENCOUNTER — HOSPITAL ENCOUNTER (INPATIENT)
Age: 34
LOS: 2 days | Discharge: HOME | End: 2024-05-05
Attending: STUDENT IN AN ORGANIZED HEALTH CARE EDUCATION/TRAINING PROGRAM | Admitting: STUDENT IN AN ORGANIZED HEALTH CARE EDUCATION/TRAINING PROGRAM
Payer: COMMERCIAL

## 2024-05-03 DIAGNOSIS — Z98.891 H/O CESAREAN SECTION: Primary | ICD-10-CM

## 2024-05-03 PROBLEM — O09.93 HIGH-RISK PREGNANCY IN THIRD TRIMESTER: Status: ACTIVE | Noted: 2024-05-03

## 2024-05-03 PROBLEM — Z3A.39 39 WEEKS GESTATION OF PREGNANCY: Status: ACTIVE | Noted: 2024-05-03

## 2024-05-03 LAB
ABO + RH BLD: NORMAL
AMPHET UR QL SCN: NEGATIVE
ARM BAND NUMBER: NORMAL
BARBITURATES UR QL SCN: NEGATIVE
BENZODIAZ UR QL: NEGATIVE
BLOOD BANK SAMPLE EXPIRATION: NORMAL
BLOOD GROUP ANTIBODIES SERPL: NEGATIVE
CANNABINOIDS UR QL SCN: NEGATIVE
COCAINE UR QL SCN: NEGATIVE
ERYTHROCYTE [DISTWIDTH] IN BLOOD BY AUTOMATED COUNT: 12.7 % (ref 11.8–14.4)
FENTANYL UR QL: NEGATIVE
HCT VFR BLD AUTO: 34.5 % (ref 36.3–47.1)
HGB BLD-MCNC: 11.2 G/DL (ref 11.9–15.1)
MCH RBC QN AUTO: 28.4 PG (ref 25.2–33.5)
MCHC RBC AUTO-ENTMCNC: 32.5 G/DL (ref 28.4–34.8)
MCV RBC AUTO: 87.6 FL (ref 82.6–102.9)
METHADONE UR QL: NEGATIVE
NRBC BLD-RTO: 0 PER 100 WBC
OPIATES UR QL SCN: NEGATIVE
OXYCODONE UR QL SCN: NEGATIVE
PCP UR QL SCN: NEGATIVE
PLATELET # BLD AUTO: 207 K/UL (ref 138–453)
PMV BLD AUTO: 11.1 FL (ref 8.1–13.5)
RBC # BLD AUTO: 3.94 M/UL (ref 3.95–5.11)
T PALLIDUM AB SER QL IA: NONREACTIVE
TEST INFORMATION: NORMAL
WBC OTHER # BLD: 11.7 K/UL (ref 3.5–11.3)

## 2024-05-03 PROCEDURE — 86901 BLOOD TYPING SEROLOGIC RH(D): CPT

## 2024-05-03 PROCEDURE — 6370000000 HC RX 637 (ALT 250 FOR IP)

## 2024-05-03 PROCEDURE — 6360000002 HC RX W HCPCS: Performed by: STUDENT IN AN ORGANIZED HEALTH CARE EDUCATION/TRAINING PROGRAM

## 2024-05-03 PROCEDURE — 1220000000 HC SEMI PRIVATE OB R&B

## 2024-05-03 PROCEDURE — 6360000002 HC RX W HCPCS

## 2024-05-03 PROCEDURE — 7100000000 HC PACU RECOVERY - FIRST 15 MIN: Performed by: STUDENT IN AN ORGANIZED HEALTH CARE EDUCATION/TRAINING PROGRAM

## 2024-05-03 PROCEDURE — 3609079900 HC CESAREAN SECTION: Performed by: STUDENT IN AN ORGANIZED HEALTH CARE EDUCATION/TRAINING PROGRAM

## 2024-05-03 PROCEDURE — 59510 CESAREAN DELIVERY: CPT | Performed by: STUDENT IN AN ORGANIZED HEALTH CARE EDUCATION/TRAINING PROGRAM

## 2024-05-03 PROCEDURE — 6360000002 HC RX W HCPCS: Performed by: NURSE ANESTHETIST, CERTIFIED REGISTERED

## 2024-05-03 PROCEDURE — 3700000000 HC ANESTHESIA ATTENDED CARE: Performed by: STUDENT IN AN ORGANIZED HEALTH CARE EDUCATION/TRAINING PROGRAM

## 2024-05-03 PROCEDURE — 88307 TISSUE EXAM BY PATHOLOGIST: CPT

## 2024-05-03 PROCEDURE — 80307 DRUG TEST PRSMV CHEM ANLYZR: CPT

## 2024-05-03 PROCEDURE — 86780 TREPONEMA PALLIDUM: CPT

## 2024-05-03 PROCEDURE — 2500000003 HC RX 250 WO HCPCS

## 2024-05-03 PROCEDURE — 86900 BLOOD TYPING SEROLOGIC ABO: CPT

## 2024-05-03 PROCEDURE — 2580000003 HC RX 258

## 2024-05-03 PROCEDURE — 6360000002 HC RX W HCPCS: Performed by: ANESTHESIOLOGY

## 2024-05-03 PROCEDURE — 7100000001 HC PACU RECOVERY - ADDTL 15 MIN: Performed by: STUDENT IN AN ORGANIZED HEALTH CARE EDUCATION/TRAINING PROGRAM

## 2024-05-03 PROCEDURE — 86850 RBC ANTIBODY SCREEN: CPT

## 2024-05-03 PROCEDURE — 6370000000 HC RX 637 (ALT 250 FOR IP): Performed by: STUDENT IN AN ORGANIZED HEALTH CARE EDUCATION/TRAINING PROGRAM

## 2024-05-03 PROCEDURE — 85027 COMPLETE CBC AUTOMATED: CPT

## 2024-05-03 PROCEDURE — 2580000003 HC RX 258: Performed by: NURSE ANESTHETIST, CERTIFIED REGISTERED

## 2024-05-03 PROCEDURE — 3700000001 HC ADD 15 MINUTES (ANESTHESIA): Performed by: STUDENT IN AN ORGANIZED HEALTH CARE EDUCATION/TRAINING PROGRAM

## 2024-05-03 RX ORDER — SENNOSIDES A AND B 8.6 MG/1
1 TABLET, FILM COATED ORAL 2 TIMES DAILY
Qty: 60 TABLET | Refills: 1 | Status: SHIPPED | OUTPATIENT
Start: 2024-05-03 | End: 2024-07-02

## 2024-05-03 RX ORDER — DEXAMETHASONE SODIUM PHOSPHATE 10 MG/ML
INJECTION, SOLUTION INTRAMUSCULAR; INTRAVENOUS PRN
Status: DISCONTINUED | OUTPATIENT
Start: 2024-05-03 | End: 2024-05-03 | Stop reason: SDUPTHER

## 2024-05-03 RX ORDER — SODIUM CHLORIDE 0.9 % (FLUSH) 0.9 %
5-40 SYRINGE (ML) INJECTION EVERY 12 HOURS SCHEDULED
Status: DISCONTINUED | OUTPATIENT
Start: 2024-05-03 | End: 2024-05-05 | Stop reason: HOSPADM

## 2024-05-03 RX ORDER — ONDANSETRON 4 MG/1
4 TABLET, FILM COATED ORAL 3 TIMES DAILY PRN
Qty: 15 TABLET | Refills: 0 | Status: SHIPPED | OUTPATIENT
Start: 2024-05-03

## 2024-05-03 RX ORDER — SODIUM CHLORIDE, SODIUM LACTATE, POTASSIUM CHLORIDE, AND CALCIUM CHLORIDE .6; .31; .03; .02 G/100ML; G/100ML; G/100ML; G/100ML
1000 INJECTION, SOLUTION INTRAVENOUS ONCE
Status: COMPLETED | OUTPATIENT
Start: 2024-05-03 | End: 2024-05-03

## 2024-05-03 RX ORDER — OXYCODONE HYDROCHLORIDE 5 MG/1
5 TABLET ORAL EVERY 4 HOURS PRN
Status: DISCONTINUED | OUTPATIENT
Start: 2024-05-03 | End: 2024-05-05 | Stop reason: HOSPADM

## 2024-05-03 RX ORDER — ONDANSETRON 4 MG/1
4 TABLET, ORALLY DISINTEGRATING ORAL EVERY 8 HOURS PRN
Status: DISCONTINUED | OUTPATIENT
Start: 2024-05-03 | End: 2024-05-05 | Stop reason: HOSPADM

## 2024-05-03 RX ORDER — ACETAMINOPHEN 500 MG
1000 TABLET ORAL EVERY 6 HOURS PRN
Qty: 60 TABLET | Refills: 1 | Status: SHIPPED | OUTPATIENT
Start: 2024-05-03

## 2024-05-03 RX ORDER — DOCUSATE SODIUM 100 MG/1
100 CAPSULE, LIQUID FILLED ORAL 2 TIMES DAILY
Status: DISCONTINUED | OUTPATIENT
Start: 2024-05-03 | End: 2024-05-05 | Stop reason: HOSPADM

## 2024-05-03 RX ORDER — ACETAMINOPHEN 500 MG
1000 TABLET ORAL EVERY 8 HOURS
Status: DISCONTINUED | OUTPATIENT
Start: 2024-05-03 | End: 2024-05-05 | Stop reason: HOSPADM

## 2024-05-03 RX ORDER — SODIUM CHLORIDE 0.9 % (FLUSH) 0.9 %
10 SYRINGE (ML) INJECTION PRN
Status: DISCONTINUED | OUTPATIENT
Start: 2024-05-03 | End: 2024-05-03

## 2024-05-03 RX ORDER — BISACODYL 10 MG
10 SUPPOSITORY, RECTAL RECTAL DAILY PRN
Status: DISCONTINUED | OUTPATIENT
Start: 2024-05-03 | End: 2024-05-05 | Stop reason: HOSPADM

## 2024-05-03 RX ORDER — IBUPROFEN 600 MG/1
600 TABLET ORAL EVERY 6 HOURS PRN
Qty: 40 TABLET | Refills: 1 | Status: SHIPPED | OUTPATIENT
Start: 2024-05-03

## 2024-05-03 RX ORDER — KETOROLAC TROMETHAMINE 30 MG/ML
30 INJECTION, SOLUTION INTRAMUSCULAR; INTRAVENOUS EVERY 6 HOURS
Status: DISCONTINUED | OUTPATIENT
Start: 2024-05-03 | End: 2024-05-04

## 2024-05-03 RX ORDER — BUPIVACAINE HYDROCHLORIDE 7.5 MG/ML
INJECTION, SOLUTION INTRASPINAL
Status: COMPLETED | OUTPATIENT
Start: 2024-05-03 | End: 2024-05-03

## 2024-05-03 RX ORDER — ONDANSETRON 2 MG/ML
4 INJECTION INTRAMUSCULAR; INTRAVENOUS EVERY 6 HOURS PRN
Status: DISCONTINUED | OUTPATIENT
Start: 2024-05-03 | End: 2024-05-03

## 2024-05-03 RX ORDER — DIPHENHYDRAMINE HYDROCHLORIDE 50 MG/ML
25 INJECTION INTRAMUSCULAR; INTRAVENOUS EVERY 6 HOURS PRN
Status: DISCONTINUED | OUTPATIENT
Start: 2024-05-03 | End: 2024-05-05 | Stop reason: HOSPADM

## 2024-05-03 RX ORDER — MORPHINE SULFATE 1 MG/ML
INJECTION, SOLUTION EPIDURAL; INTRATHECAL; INTRAVENOUS PRN
Status: DISCONTINUED | OUTPATIENT
Start: 2024-05-03 | End: 2024-05-03 | Stop reason: SDUPTHER

## 2024-05-03 RX ORDER — SCOLOPAMINE TRANSDERMAL SYSTEM 1 MG/1
1 PATCH, EXTENDED RELEASE TRANSDERMAL ONCE
Status: DISCONTINUED | OUTPATIENT
Start: 2024-05-03 | End: 2024-05-05 | Stop reason: HOSPADM

## 2024-05-03 RX ORDER — SODIUM CHLORIDE 0.9 % (FLUSH) 0.9 %
5-40 SYRINGE (ML) INJECTION PRN
Status: DISCONTINUED | OUTPATIENT
Start: 2024-05-03 | End: 2024-05-05 | Stop reason: HOSPADM

## 2024-05-03 RX ORDER — ONDANSETRON 2 MG/ML
4 INJECTION INTRAMUSCULAR; INTRAVENOUS EVERY 6 HOURS PRN
Status: DISCONTINUED | OUTPATIENT
Start: 2024-05-03 | End: 2024-05-05 | Stop reason: HOSPADM

## 2024-05-03 RX ORDER — LANOLIN 72 %
OINTMENT (GRAM) TOPICAL
Status: DISCONTINUED | OUTPATIENT
Start: 2024-05-03 | End: 2024-05-05 | Stop reason: HOSPADM

## 2024-05-03 RX ORDER — SIMETHICONE 80 MG
80 TABLET,CHEWABLE ORAL 4 TIMES DAILY PRN
Qty: 90 TABLET | Refills: 1 | Status: SHIPPED | OUTPATIENT
Start: 2024-05-03

## 2024-05-03 RX ORDER — ONDANSETRON 2 MG/ML
INJECTION INTRAMUSCULAR; INTRAVENOUS PRN
Status: DISCONTINUED | OUTPATIENT
Start: 2024-05-03 | End: 2024-05-03 | Stop reason: SDUPTHER

## 2024-05-03 RX ORDER — OXYCODONE HYDROCHLORIDE 5 MG/1
10 TABLET ORAL EVERY 4 HOURS PRN
Status: DISCONTINUED | OUTPATIENT
Start: 2024-05-03 | End: 2024-05-05 | Stop reason: HOSPADM

## 2024-05-03 RX ORDER — SODIUM CHLORIDE 9 MG/ML
INJECTION, SOLUTION INTRAVENOUS PRN
Status: DISCONTINUED | OUTPATIENT
Start: 2024-05-03 | End: 2024-05-05 | Stop reason: HOSPADM

## 2024-05-03 RX ORDER — CITRIC ACID/SODIUM CITRATE 334-500MG
30 SOLUTION, ORAL ORAL ONCE
Status: COMPLETED | OUTPATIENT
Start: 2024-05-03 | End: 2024-05-03

## 2024-05-03 RX ORDER — IBUPROFEN 800 MG/1
800 TABLET ORAL EVERY 8 HOURS
Status: DISCONTINUED | OUTPATIENT
Start: 2024-05-04 | End: 2024-05-04

## 2024-05-03 RX ORDER — OXYCODONE HYDROCHLORIDE 5 MG/1
5 TABLET ORAL EVERY 6 HOURS PRN
Qty: 20 TABLET | Refills: 0 | Status: SHIPPED | OUTPATIENT
Start: 2024-05-03 | End: 2024-05-10

## 2024-05-03 RX ORDER — SODIUM CHLORIDE, SODIUM LACTATE, POTASSIUM CHLORIDE, CALCIUM CHLORIDE 600; 310; 30; 20 MG/100ML; MG/100ML; MG/100ML; MG/100ML
INJECTION, SOLUTION INTRAVENOUS CONTINUOUS
Status: DISCONTINUED | OUTPATIENT
Start: 2024-05-03 | End: 2024-05-03

## 2024-05-03 RX ORDER — SODIUM CHLORIDE 0.9 % (FLUSH) 0.9 %
10 SYRINGE (ML) INJECTION EVERY 12 HOURS SCHEDULED
Status: DISCONTINUED | OUTPATIENT
Start: 2024-05-03 | End: 2024-05-03

## 2024-05-03 RX ORDER — POLYETHYLENE GLYCOL 3350 17 G/17G
17 POWDER, FOR SOLUTION ORAL DAILY PRN
Status: DISCONTINUED | OUTPATIENT
Start: 2024-05-03 | End: 2024-05-05 | Stop reason: HOSPADM

## 2024-05-03 RX ORDER — SODIUM CHLORIDE, SODIUM LACTATE, POTASSIUM CHLORIDE, CALCIUM CHLORIDE 600; 310; 30; 20 MG/100ML; MG/100ML; MG/100ML; MG/100ML
INJECTION, SOLUTION INTRAVENOUS CONTINUOUS
Status: DISCONTINUED | OUTPATIENT
Start: 2024-05-03 | End: 2024-05-04

## 2024-05-03 RX ORDER — SODIUM CHLORIDE 9 MG/ML
INJECTION, SOLUTION INTRAVENOUS PRN
Status: DISCONTINUED | OUTPATIENT
Start: 2024-05-03 | End: 2024-05-03

## 2024-05-03 RX ORDER — VITAMIN A, ASCORBIC ACID, CHOLECALCIFEROL, .ALPHA.-TOCOPHEROL ACETATE, DL-, THIAMINE MONONITRATE, RIBOFLAVIN, NIACINAMIDE, PYRIDOXINE HYDROCHLORIDE, FOLIC ACID, CYANOCOBALAMIN, CALCIUM CARBONATE, IRON, ZINC OXIDE, AND CUPRIC OXIDE 4000; 120; 400; 22; 1.84; 3; 20; 10; 1; 12; 200; 29; 25; 2 [IU]/1; MG/1; [IU]/1; [IU]/1; MG/1; MG/1; MG/1; MG/1; MG/1; UG/1; MG/1; MG/1; MG/1; MG/1
1 TABLET ORAL DAILY
Status: DISCONTINUED | OUTPATIENT
Start: 2024-05-03 | End: 2024-05-05 | Stop reason: HOSPADM

## 2024-05-03 RX ORDER — SIMETHICONE 80 MG
80 TABLET,CHEWABLE ORAL EVERY 6 HOURS PRN
Status: DISCONTINUED | OUTPATIENT
Start: 2024-05-03 | End: 2024-05-05 | Stop reason: HOSPADM

## 2024-05-03 RX ORDER — ACETAMINOPHEN 500 MG
1000 TABLET ORAL ONCE
Status: COMPLETED | OUTPATIENT
Start: 2024-05-03 | End: 2024-05-03

## 2024-05-03 RX ADMIN — Medication 909 ML/HR: at 13:47

## 2024-05-03 RX ADMIN — SODIUM CHLORIDE, POTASSIUM CHLORIDE, SODIUM LACTATE AND CALCIUM CHLORIDE: 600; 310; 30; 20 INJECTION, SOLUTION INTRAVENOUS at 13:00

## 2024-05-03 RX ADMIN — Medication 2000 MG: at 13:08

## 2024-05-03 RX ADMIN — SODIUM CHLORIDE, POTASSIUM CHLORIDE, SODIUM LACTATE AND CALCIUM CHLORIDE 1000 ML: 600; 310; 30; 20 INJECTION, SOLUTION INTRAVENOUS at 10:41

## 2024-05-03 RX ADMIN — SODIUM CHLORIDE, POTASSIUM CHLORIDE, SODIUM LACTATE AND CALCIUM CHLORIDE: 600; 310; 30; 20 INJECTION, SOLUTION INTRAVENOUS at 14:32

## 2024-05-03 RX ADMIN — DEXAMETHASONE SODIUM PHOSPHATE 10 MG: 10 INJECTION, SOLUTION INTRAMUSCULAR; INTRAVENOUS at 13:49

## 2024-05-03 RX ADMIN — SODIUM CHLORIDE, POTASSIUM CHLORIDE, SODIUM LACTATE AND CALCIUM CHLORIDE: 600; 310; 30; 20 INJECTION, SOLUTION INTRAVENOUS at 13:42

## 2024-05-03 RX ADMIN — KETOROLAC TROMETHAMINE 30 MG: 30 INJECTION, SOLUTION INTRAMUSCULAR; INTRAVENOUS at 22:40

## 2024-05-03 RX ADMIN — Medication 87.3 MILLI-UNITS/MIN: at 14:27

## 2024-05-03 RX ADMIN — SODIUM CITRATE AND CITRIC ACID MONOHYDRATE 30 ML: 500; 334 SOLUTION ORAL at 13:05

## 2024-05-03 RX ADMIN — MORPHINE SULFATE 0.2 MG: 1 INJECTION, SOLUTION EPIDURAL; INTRATHECAL; INTRAVENOUS at 13:22

## 2024-05-03 RX ADMIN — SODIUM CHLORIDE, PRESERVATIVE FREE 20 MG: 5 INJECTION INTRAVENOUS at 11:22

## 2024-05-03 RX ADMIN — ACETAMINOPHEN 1000 MG: 500 TABLET ORAL at 11:19

## 2024-05-03 RX ADMIN — DOCUSATE SODIUM 100 MG: 100 CAPSULE, LIQUID FILLED ORAL at 22:39

## 2024-05-03 RX ADMIN — ONDANSETRON 4 MG: 2 INJECTION INTRAMUSCULAR; INTRAVENOUS at 13:49

## 2024-05-03 RX ADMIN — BUPIVACAINE HYDROCHLORIDE 15 MG: 7.5 INJECTION, SOLUTION INTRASPINAL at 13:22

## 2024-05-03 RX ADMIN — ACETAMINOPHEN 1000 MG: 500 TABLET ORAL at 22:40

## 2024-05-03 RX ADMIN — KETOROLAC TROMETHAMINE 30 MG: 30 INJECTION, SOLUTION INTRAMUSCULAR; INTRAVENOUS at 15:56

## 2024-05-03 ASSESSMENT — PAIN SCALES - GENERAL
PAINLEVEL_OUTOF10: 1
PAINLEVEL_OUTOF10: 1
PAINLEVEL_OUTOF10: 0
PAINLEVEL_OUTOF10: 0

## 2024-05-03 ASSESSMENT — PAIN DESCRIPTION - DESCRIPTORS
DESCRIPTORS: ACHING
DESCRIPTORS: CRAMPING

## 2024-05-03 ASSESSMENT — PAIN DESCRIPTION - ORIENTATION: ORIENTATION: LOWER

## 2024-05-03 ASSESSMENT — PAIN DESCRIPTION - LOCATION
LOCATION: ABDOMEN
LOCATION: ABDOMEN

## 2024-05-03 NOTE — FLOWSHEET NOTE
Patient arrived o L&D unit for a scheduled repeat C/S.    Patient reports +FM, denies vaginal bleeding, leaking leaking of fluid.    Given hospital gown and instructed on urine specimen.    Instructed on CHG abd wash.

## 2024-05-03 NOTE — CARE COORDINATION
ANTEPARTUM NOTE    History of  [Z98.891]  39 weeks gestation of pregnancy [Z3A.39]    Tisha was admitted to L&D on 5/3/24 for scheduled repeat C/S @ 39w0d    OB GYN Provider: Dr. Moni Veronica    Will meet with patient after delivery to verify name/address/phone/insurance and discuss discharge planning.     Anticipate DC home 2 nights after vaginal delivery or 4 nights after C/S delivery as long as hemodynamically stable.

## 2024-05-03 NOTE — FLOWSHEET NOTE
Patient transferred to Post Partum 7C room 750 per bed.  Infant in mother's arms.  Bedside report given to Elsie MONTES RN.  Infant bands verified.  Fundal height verified.

## 2024-05-03 NOTE — ANESTHESIA PROCEDURE NOTES
Spinal Block    Patient location during procedure: OB  End time: 5/3/2024 1:26 PM  Reason for block: procedure for pain and primary anesthetic  Staffing  Performed: anesthesiologist   Anesthesiologist: Nate Hayes MD  Resident/CRNA: Afshan Brown APRN - CRNA  Performed by: Afshan Brown APRN - CRNA  Authorized by: Nate Hayes MD    Spinal Block  Patient position: sitting  Prep: Betadine and site prepped and draped  Patient monitoring: continuous pulse ox and frequent blood pressure checks  Approach: midline  Location: L3/L4  Provider prep: mask and sterile gloves  Local infiltration: lidocaine  Needle  Needle type: Brandt   Needle gauge: 24 G  Needle length: 4 in  Assessment  Sensory level: T6  Swirl obtained: Yes  CSF: clear  Attempts: 2  Hemodynamics: stable  Preanesthetic Checklist  Completed: patient identified, IV checked, site marked, risks and benefits discussed, surgical/procedural consents, equipment checked, pre-op evaluation, timeout performed, anesthesia consent given, oxygen available, monitors applied/VS acknowledged, fire risk safety assessment completed and verbalized and blood product R/B/A discussed and consented

## 2024-05-03 NOTE — OP NOTE
Operative Note  Department of Obstetrics and Gynecology  [facility]     Patient: Tisha Tracey   : 1990  MRN: [id number]       Acct: [id number]   PCP: Soo Ricci APRN - CNP  Date of Procedure: 5/3/24    Pre-operative Diagnosis: 34 y.o. female  at 39w2d   Hx of , declining TOLAC  AMA   Hx of gestational hypertension   BMI 34      Post-operative Diagnosis:   Hx of , declining TOLAC  AMA   Hx of gestational hypertension   BMI 34    [place] living female infant     Procedure: Repeat low transverse  section    Indications: Patient is a 34 yr old  at 39w2d here for a scheduled repeat  section. She has a history of one prior  delivery. She declined trial of labor after . She received Tylenol, Bicitra, Pepcid, Ancef and a scop patch pre operatively.     Surgeon: Dr Missy Montenegro   Assistants: Dr Jennifer Milan     Anesthesia: spinal with duramorph    Procedure Details   The patient was seen pre-operatively. The risks, benefits, complications, treatment options, and expected outcomes were discussed with the patient. The patient concurred with the proposed plan, giving informed consent. The patient was taken to the Operating Room, identified as Tisha Tracey and the procedure verified as  Delivery. A Time Out was held and the above information confirmed.     After spinal anesthesia, the patient was draped and prepped in the usual sterile manner. A Pfannenstiel incision was made and carried down through the subcutaneous tissue to the fascia using scalpel. Fascial incision was made and extended transversely using blunt dissection for sharp dissection. The fascia was  from the underlying rectus tissue superiorly and inferiorly using stewart scissors. The peritoneum was identified and entered bluntly. Bovie electrocautery was used to take the peritoneum down sharply due to thick adhesions. Peritoneal incision was

## 2024-05-03 NOTE — FLOWSHEET NOTE
Patient admitted to room 750 from L&D via bed.   Oriented to room and surroundings.  Plan of care reviewed.  Verbalized understanding.  Instructed on infant security and safe sleep practices.  Preventing falls education provided .The following handouts given: A New Beginning: Your Guide to Postpartum Care, Rounding, gs Security System,Babies Cry A lot, Safe Sleep, Security and Visitation Guidelines.   Call light placed within reach.

## 2024-05-03 NOTE — ANESTHESIA PRE PROCEDURE
BP Readings from Last 3 Encounters:   05/01/24 110/68   04/24/24 121/74   04/17/24 120/79       NPO Status:                                                                                 BMI:   Wt Readings from Last 3 Encounters:   05/01/24 98.9 kg (218 lb)   04/24/24 99.2 kg (218 lb 12.8 oz)   04/17/24 98 kg (216 lb)     There is no height or weight on file to calculate BMI.    CBC:   Lab Results   Component Value Date/Time    WBC 12.9 03/20/2024 09:49 AM    RBC 3.76 03/20/2024 09:49 AM    HGB 11.0 03/20/2024 09:49 AM    HCT 32.8 03/20/2024 09:49 AM    MCV 87.4 03/20/2024 09:49 AM    RDW 12.5 03/20/2024 09:49 AM     03/20/2024 09:49 AM       CMP:   Lab Results   Component Value Date/Time     09/29/2023 01:01 PM    K 3.9 09/29/2023 01:01 PM    CL 96 09/29/2023 01:01 PM    CO2 25 09/29/2023 01:01 PM    BUN 6 09/29/2023 01:01 PM    CREATININE 0.5 09/29/2023 01:01 PM    GFRAA >60 05/27/2022 10:15 AM    LABGLOM >60 09/29/2023 01:01 PM    GLUCOSE 123 03/20/2024 09:49 AM    GLUCOSE 82 09/29/2023 01:01 PM    CALCIUM 9.3 09/29/2023 01:01 PM    BILITOT <0.10 04/18/2021 08:05 AM    ALKPHOS 117 04/18/2021 08:05 AM    AST 17 04/18/2021 08:05 AM    ALT 9 04/18/2021 08:05 AM       POC Tests: No results for input(s): \"POCGLU\", \"POCNA\", \"POCK\", \"POCCL\", \"POCBUN\", \"POCHEMO\", \"POCHCT\" in the last 72 hours.    Coags: No results found for: \"PROTIME\", \"INR\", \"APTT\"    HCG (If Applicable):   Lab Results   Component Value Date    PREGTESTUR positive 09/08/2020        ABGs: No results found for: \"PHART\", \"PO2ART\", \"KNQ0OYY\", \"CPQ6QJL\", \"BEART\", \"T0NIZTCX\"     Type & Screen (If Applicable):  No results found for: \"LABABO\"    Drug/Infectious Status (If Applicable):  Lab Results   Component Value Date/Time    HEPCAB NONREACTIVE 10/12/2023 02:11 PM       COVID-19 Screening (If Applicable):   Lab Results   Component Value Date/Time    COVID19 Not Detected 04/13/2021 09:00 AM           Anesthesia Evaluation  Patient  GBS (group B Streptococcus carrier), +RV culture, currently pregnant 04/19/2024       Will need abx in labor       Hx gHTN (G2) 10/23/2023       Baby ASA @ 12 weeks       Hyperemesis gravidarum before end of 22 week gestation with dehydration 09/29/2023       Will go to the ED for IVF unable to schedule with infusion center       Hx SAB x 1 (G1)          Nate Hayes MD   5/3/2024

## 2024-05-04 LAB
ERYTHROCYTE [DISTWIDTH] IN BLOOD BY AUTOMATED COUNT: 12.7 % (ref 11.8–14.4)
HCT VFR BLD AUTO: 30.6 % (ref 36.3–47.1)
HGB BLD-MCNC: 9.6 G/DL (ref 11.9–15.1)
MCH RBC QN AUTO: 28.7 PG (ref 25.2–33.5)
MCHC RBC AUTO-ENTMCNC: 31.4 G/DL (ref 28.4–34.8)
MCV RBC AUTO: 91.3 FL (ref 82.6–102.9)
NRBC BLD-RTO: 0 PER 100 WBC
PLATELET # BLD AUTO: 189 K/UL (ref 138–453)
PMV BLD AUTO: 11.3 FL (ref 8.1–13.5)
RBC # BLD AUTO: 3.35 M/UL (ref 3.95–5.11)
WBC OTHER # BLD: 16.7 K/UL (ref 3.5–11.3)

## 2024-05-04 PROCEDURE — 36415 COLL VENOUS BLD VENIPUNCTURE: CPT

## 2024-05-04 PROCEDURE — 1220000000 HC SEMI PRIVATE OB R&B

## 2024-05-04 PROCEDURE — 6370000000 HC RX 637 (ALT 250 FOR IP): Performed by: STUDENT IN AN ORGANIZED HEALTH CARE EDUCATION/TRAINING PROGRAM

## 2024-05-04 PROCEDURE — 85027 COMPLETE CBC AUTOMATED: CPT

## 2024-05-04 PROCEDURE — 6360000002 HC RX W HCPCS: Performed by: STUDENT IN AN ORGANIZED HEALTH CARE EDUCATION/TRAINING PROGRAM

## 2024-05-04 PROCEDURE — 6370000000 HC RX 637 (ALT 250 FOR IP)

## 2024-05-04 RX ORDER — FERROUS SULFATE 325(65) MG
325 TABLET ORAL EVERY OTHER DAY
Qty: 180 TABLET | Refills: 0 | Status: SHIPPED | OUTPATIENT
Start: 2024-05-04

## 2024-05-04 RX ORDER — IBUPROFEN 600 MG/1
600 TABLET ORAL EVERY 6 HOURS
Status: DISCONTINUED | OUTPATIENT
Start: 2024-05-04 | End: 2024-05-05 | Stop reason: HOSPADM

## 2024-05-04 RX ORDER — UREA 10 %
325 LOTION (ML) TOPICAL
Status: DISCONTINUED | OUTPATIENT
Start: 2024-05-05 | End: 2024-05-05 | Stop reason: HOSPADM

## 2024-05-04 RX ADMIN — KETOROLAC TROMETHAMINE 30 MG: 30 INJECTION, SOLUTION INTRAMUSCULAR; INTRAVENOUS at 05:15

## 2024-05-04 RX ADMIN — IBUPROFEN 600 MG: 600 TABLET, FILM COATED ORAL at 21:43

## 2024-05-04 RX ADMIN — DOCUSATE SODIUM 100 MG: 100 CAPSULE, LIQUID FILLED ORAL at 21:44

## 2024-05-04 RX ADMIN — DOCUSATE SODIUM 100 MG: 100 CAPSULE, LIQUID FILLED ORAL at 11:28

## 2024-05-04 RX ADMIN — IBUPROFEN 600 MG: 600 TABLET, FILM COATED ORAL at 05:52

## 2024-05-04 RX ADMIN — Medication 1 TABLET: at 11:27

## 2024-05-04 RX ADMIN — ACETAMINOPHEN 1000 MG: 500 TABLET ORAL at 05:15

## 2024-05-04 RX ADMIN — ACETAMINOPHEN 1000 MG: 500 TABLET ORAL at 19:10

## 2024-05-04 RX ADMIN — SIMETHICONE 80 MG: 80 TABLET, CHEWABLE ORAL at 21:57

## 2024-05-04 RX ADMIN — IBUPROFEN 600 MG: 600 TABLET, FILM COATED ORAL at 13:33

## 2024-05-04 RX ADMIN — MAGNESIUM HYDROXIDE 30 ML: 400 SUSPENSION ORAL at 21:43

## 2024-05-04 RX ADMIN — ACETAMINOPHEN 1000 MG: 500 TABLET ORAL at 11:28

## 2024-05-04 ASSESSMENT — PAIN DESCRIPTION - DESCRIPTORS
DESCRIPTORS: ACHING;CRAMPING
DESCRIPTORS: ACHING
DESCRIPTORS: ACHING;SORE
DESCRIPTORS: ACHING
DESCRIPTORS: ACHING

## 2024-05-04 ASSESSMENT — PAIN DESCRIPTION - LOCATION
LOCATION: ABDOMEN

## 2024-05-04 ASSESSMENT — PAIN DESCRIPTION - ORIENTATION
ORIENTATION: LOWER
ORIENTATION: MID;LOWER
ORIENTATION: LOWER
ORIENTATION: MID;LOWER
ORIENTATION: MID;LOWER

## 2024-05-04 ASSESSMENT — PAIN SCALES - GENERAL
PAINLEVEL_OUTOF10: 5
PAINLEVEL_OUTOF10: 1
PAINLEVEL_OUTOF10: 2
PAINLEVEL_OUTOF10: 2
PAINLEVEL_OUTOF10: 4
PAINLEVEL_OUTOF10: 3

## 2024-05-04 NOTE — PROGRESS NOTES
POST OPERATIVE DAY # 1    Tisha Tracey is a 34 y.o. female   This patient was seen and examined today.     Her pregnancy was complicated by:   Patient Active Problem List   Diagnosis    Hx SAB x 1 (G1)    PLTCS 21 M Apg 8/9 Wt 8#6    Hyperemesis gravidarum before end of 22 week gestation with dehydration    Hx gHTN (G2)    GBS (group B Streptococcus carrier), +RV culture, currently pregnant    39 weeks gestation of pregnancy    RLTCS 5/3/24 F Apg 8/9 Wt 7#10    BMI 34.0-34.9,adult    History of  delivery    High-risk pregnancy in third trimester       Today she is doing well without any chief complaint. Her lochia is light. She denies chest pain, shortness of breath, headache, and blurred vision. She is  breast feeding and she denies any signs or symptoms of mastitis.  She is ambulating well. She has not voided since oliva was removed. She currently denies S/S of postpartum depression. Flatus present.  Bowel movement absent. She is tolerating solids.    Vital Signs:  Vitals:    24 1700 24 2100 24 0100 24 0500   BP: 114/65 110/63 (!) 101/45 (!) 116/50   Pulse: 65 79 76 82   Resp: 18 17 17 17   Temp: 98.7 °F (37.1 °C) 98.1 °F (36.7 °C)     TempSrc: Oral Oral     SpO2: 97% 98% 98%    Weight:       Height:             Urine Input & Output last 24hrs:     Intake/Output Summary (Last 24 hours) at 2024 0712  Last data filed at 2024 0219  Gross per 24 hour   Intake 3426.73 ml   Output 885 ml   Net 2541.73 ml       Physical Exam:  General:  no apparent distress, alert, and cooperative  Neurologic:  alert, oriented, normal speech, no focal findings or movement disorder noted  Lungs:  No increased work of breathing, no conversational dyspnea  Heart:  regular rate and rhythm    Abdomen: abdomen soft, non-distended, non-tender  Fundus: non-tender, normal size, firm, below umbilicus  Incision: silver dressing in place with minimal saturation   Extremities:  no calf tenderness, non

## 2024-05-04 NOTE — CONSULTS
Mom unsure baby is getting enough at breast. Reviewed how to know baby is getting enough at breast. Mom wants to breastfeed and also use a breast pump. She brought her breast pump from home. Reviewed options of feeding methods and combining breast and bottle feeding. Packet of breastfeeding information given and reviewed. Discussed positions that may work well for her. Encouraged her to call out for assistance as needed.

## 2024-05-04 NOTE — CARE COORDINATION
CASE MANAGEMENT POST-PARTUM TRANSITIONAL CARE PLAN    History of  [Z98.891]  39 weeks gestation of pregnancy [Z3A.39]    OB Provider: Dr Montenegro    Writer met w/ Tisha and her  Jimy at her bedside to discuss DCP. She is S/P CS  on 5/3/2024    Writer verified address/phone number correct on facesheet. She states she lives with her  and other son Rock. Tisha denied barriers with transportation home, to doctors appointments or with paying for medications upon discharge home.     [employer] insurance correct. Writer notified Jimy that he has 30 days from date of birth to add  to insurance policy. He verbalized understanding.    Tisha confirmed a safe place for infant to sleep at home.    Infant name on BC: Torres Tarcey.   Infant PCP Bran Potts.     DME: no  HOME CARE: no    Anticipated DC of mother and infant 2-4 days after CS delivery.    Readmission Risk              Risk of Unplanned Readmission:  5

## 2024-05-04 NOTE — FLOWSHEET NOTE
Toradol was scanned at 0515, was unable to give as IV had clotted. IV was removed and an order for motrin was initiated

## 2024-05-05 VITALS
DIASTOLIC BLOOD PRESSURE: 76 MMHG | HEIGHT: 67 IN | HEART RATE: 83 BPM | SYSTOLIC BLOOD PRESSURE: 118 MMHG | BODY MASS INDEX: 34.21 KG/M2 | OXYGEN SATURATION: 98 % | TEMPERATURE: 97.7 F | RESPIRATION RATE: 16 BRPM | WEIGHT: 218 LBS

## 2024-05-05 PROCEDURE — 6370000000 HC RX 637 (ALT 250 FOR IP)

## 2024-05-05 PROCEDURE — 6370000000 HC RX 637 (ALT 250 FOR IP): Performed by: STUDENT IN AN ORGANIZED HEALTH CARE EDUCATION/TRAINING PROGRAM

## 2024-05-05 RX ADMIN — ACETAMINOPHEN 1000 MG: 500 TABLET ORAL at 03:41

## 2024-05-05 RX ADMIN — Medication 1 TABLET: at 10:25

## 2024-05-05 RX ADMIN — OXYCODONE 5 MG: 5 TABLET ORAL at 03:44

## 2024-05-05 RX ADMIN — IBUPROFEN 600 MG: 600 TABLET, FILM COATED ORAL at 10:26

## 2024-05-05 RX ADMIN — ACETAMINOPHEN 1000 MG: 500 TABLET ORAL at 10:26

## 2024-05-05 RX ADMIN — IBUPROFEN 600 MG: 600 TABLET, FILM COATED ORAL at 03:41

## 2024-05-05 RX ADMIN — FERROUS SULFATE TAB EC 325 MG (65 MG FE EQUIVALENT) 325 MG: 325 (65 FE) TABLET DELAYED RESPONSE at 10:25

## 2024-05-05 ASSESSMENT — PAIN SCALES - GENERAL
PAINLEVEL_OUTOF10: 6
PAINLEVEL_OUTOF10: 6
PAINLEVEL_OUTOF10: 4

## 2024-05-05 ASSESSMENT — PAIN DESCRIPTION - LOCATION: LOCATION: ABDOMEN

## 2024-05-05 ASSESSMENT — PAIN DESCRIPTION - ORIENTATION: ORIENTATION: MID;LOWER

## 2024-05-05 ASSESSMENT — PAIN DESCRIPTION - DESCRIPTORS: DESCRIPTORS: ACHING

## 2024-05-05 NOTE — LACTATION NOTE
Mom reports her baby's weight is down, so she is using formula after feeds at breast. Breastfeeding discharge reviewed discussing length/frequency of feeds, how to know baby is getting enough at breast, and comfort measures for engorgement. Discussed refining baby's position at breast and obtaining a deep latch. Reviewed nursing for up to 10 minutes per breast, then supplementing after, until the onset of the full supply. Measured mom for pump flange size and explained where to get silicone flange inserts if needed. Written information on using the Spectra pump given. Encouraged mom to call for assistance with using the breast pump or for a Lactation appointment.

## 2024-05-05 NOTE — PROGRESS NOTES
CLINICAL PHARMACY NOTE: MEDS TO BEDS    Total # of Prescriptions Filled: 7   The following medications were delivered to the patient:  Motrin  Senna  Tylenol  Ferosul  Oxycodone  Zofran  Gas relief    Additional Documentation:

## 2024-05-05 NOTE — PROGRESS NOTES
POST OPERATIVE DAY #2    Tisha Tracey is a 34 y.o. female   This patient was seen and examined today. RLTCS on 5/3/24    Her pregnancy was complicated by:   Patient Active Problem List   Diagnosis    Hx SAB x 1 (G1)    PLTCS 21 M Apg 8/9 Wt 8#6    Hyperemesis gravidarum before end of 22 week gestation with dehydration    Hx gHTN (G2)    GBS (group B Streptococcus carrier), +RV culture, currently pregnant    39 weeks gestation of pregnancy    RLTCS 5/3/24 F Apg 8/9 Wt 7#10    BMI 34.0-34.9,adult    History of  delivery    High-risk pregnancy in third trimester       Today she is doing well without any chief complaint. Her lochia is light. She denies chest pain, shortness of breath, headache, lightheadedness, blurred vision and peripheral edema. She is breast feeding and she denies any signs or symptoms of mastitis.  She is ambulating well. She is voiding without difficulty. She currently denies S/S of postpartum depression. Flatus present.  Bowel movement absent. She is tolerating solids.    Vital Signs:  Vitals:    24 0900 24 1300 24 1600 24   BP: 102/88 105/68 108/74 99/60   Pulse: 66 80 77 80   Resp: 18 18 18 16   Temp: 98.1 °F (36.7 °C) 97.4 °F (36.3 °C) 97.8 °F (36.6 °C) 98.3 °F (36.8 °C)   TempSrc: Oral Oral Oral Oral   SpO2: 97%   98%   Weight:       Height:           Urine Input & Output last 24hrs:     Intake/Output Summary (Last 24 hours) at 2024 0627  Last data filed at 2024 0900  Gross per 24 hour   Intake --   Output 300 ml   Net -300 ml       Physical Exam:  General:  no apparent distress, alert and cooperative  Neurologic:  alert, oriented, normal speech, no focal findings or movement disorder noted  Lungs:  No increased work of breathing, good air exchange, no cyanosis  Heart:  regular rate  Abdomen: abdomen soft, non-distended, appropriately tender  Fundus: appropriately tender, firm, below umbilicus  Extremities:  no calf tenderness, non

## 2024-05-05 NOTE — FLOWSHEET NOTE
Discharge instructions given, all questions answered.  I have reviewed all [facility] Post-Birth Warning Signs and essential teaching points for pulmonary embolism, cardiac disease, hypertensive disorders of pregnancy, obstetric hemorrhage, venous thromboembolism, infection, and postpartum depression with the patient and Carmelo (support person) . I have informed the patient on when to call their healthcare provider and when to call 911. I have discussed with the patient  the importance of scheduling a follow-up visit with their physician, nurse practitioner or midwife and provided them with correct contact information for appointment. I have provided the patient with a copy of the \"Save Your Life\" handout. The patient has acknowledged receiving and understanding this education with her signature.

## 2024-05-05 NOTE — PLAN OF CARE
Problem: Vaginal Birth or  Section  Goal: Fetal and maternal status remain reassuring during the birth process  Description:  Birth OB-Pregnancy care plan goal which identifies if the fetal and maternal status remain reassuring during the birth process  Outcome: Adequate for Discharge     Problem: Pain  Goal: Verbalizes/displays adequate comfort level or baseline comfort level  Outcome: Adequate for Discharge     Problem: Infection - Adult  Goal: Absence of infection at discharge  Outcome: Adequate for Discharge  Goal: Absence of infection during hospitalization  Outcome: Adequate for Discharge  Goal: Absence of fever/infection during anticipated neutropenic period  Outcome: Adequate for Discharge     Problem: Safety - Adult  Goal: Free from fall injury  Outcome: Adequate for Discharge     Problem: Discharge Planning  Goal: Discharge to home or other facility with appropriate resources  Outcome: Adequate for Discharge     Problem: Postpartum  Goal: Experiences normal postpartum course  Description:  Postpartum OB-Pregnancy care plan goal which identifies if the mother is experiencing a normal postpartum course  Outcome: Adequate for Discharge  Goal: Appropriate maternal -  bonding  Description:  Postpartum OB-Pregnancy care plan goal which identifies if the mother and  are bonding appropriately  Outcome: Adequate for Discharge  Goal: Establishment of infant feeding pattern  Description:  Postpartum OB-Pregnancy care plan goal which identifies if the mother is establishing a feeding pattern with their   Outcome: Adequate for Discharge  Goal: Incisions, wounds, or drain sites healing without S/S of infection  Outcome: Adequate for Discharge

## 2024-05-07 LAB — SURGICAL PATHOLOGY REPORT: NORMAL

## 2024-05-09 ENCOUNTER — POSTPARTUM VISIT (OUTPATIENT)
Dept: OBGYN CLINIC | Age: 34
End: 2024-05-09

## 2024-05-09 VITALS
WEIGHT: 205.8 LBS | DIASTOLIC BLOOD PRESSURE: 82 MMHG | HEIGHT: 67 IN | HEART RATE: 83 BPM | SYSTOLIC BLOOD PRESSURE: 121 MMHG | BODY MASS INDEX: 32.3 KG/M2

## 2024-05-09 DIAGNOSIS — Z48.89 ENCOUNTER FOR POST SURGICAL WOUND CHECK: Primary | ICD-10-CM

## 2024-05-09 PROCEDURE — 0503F POSTPARTUM CARE VISIT: CPT

## 2024-05-09 NOTE — PROGRESS NOTES
[facility]  [facility]  [address]  [address]  [place] [address]  Dept: [phone]     Encounter date: 24   PCP: Soo Ricci, THAIS - CNP     Subjective: Tisha Tracey is a 34 y.o.  here for Postpartum Care (Breast feeeding,  )   1 weeks Post Partum s/p repeat  delivery on  5/3 Her pregnancy was complicated by:    Patient Active Problem List   Diagnosis    Hx SAB x 1 (G1)    PLTCS 21 M Apg 8/9 Wt 8#6    Hyperemesis gravidarum before end of 22 week gestation with dehydration    Hx gHTN (G2)    GBS (group B Streptococcus carrier), +RV culture, currently pregnant    39 weeks gestation of pregnancy    RLTCS 5/3/24 F Apg 8/9 Wt 7#10    BMI 34.0-34.9,adult    History of  delivery    High-risk pregnancy in third trimester      She is  breast feeding and/or pumping breastmilk and denies signs or symptoms of engorgement/mastitis. She has an effective pump along with all needed supplies.      Depression Scale  In the past 7 days:  I have been able to laugh and see the funny side of things: Not quite so much now  I have looked forward with enjoyment to things: Rather less than I used to  I have blamed myself unnecessarily when things went wrong: Yes, some of the time  I have been anxious or worried for no good reason: Yes, sometimes  I have felt scared or panicky for no good reason: Yes, sometimes  I haven't been able to cope lately: Yes, sometimes I haven't been coping as well as usual  I have been so unhappy that I have had difficulty sleeping: Yes, sometimes  I have felt sad or miserable: Not very often  I have been so unhappy that I have been crying: Yes, quite often  The thought of harming myself has occurred to me: Never  Total: 15 She denies any suicidal thoughts with a plan, intent to harm others, and delusional ideas. She does admit to having good home support.     She delivered a girl who

## 2024-05-13 NOTE — ANESTHESIA POSTPROCEDURE EVALUATION
Department of Anesthesiology  Postprocedure Note    Patient: Tisha Tracey  MRN: [id number]  YOB: 1990  Date of evaluation: 2024    Procedure Summary       Date: 24 Room / Location: [address]    Anesthesia Start: 1311 Anesthesia Stop: 1431    Procedure:  SECTION Diagnosis:       History of       (History of  [Z98.891])    Surgeons: Missy Veronica DO Responsible Provider: Nate Hayes MD    Anesthesia Type: spinal ASA Status: 2            Anesthesia Type: No value filed.    Tomer Phase I: Tomer Score: 9    Tomer Phase II: Tomer Score: 10    Anesthesia Post Evaluation    Patient location during evaluation: PACU  Patient participation: complete - patient participated  Level of consciousness: awake  Pain score: 1  Airway patency: patent  Nausea & Vomiting: no nausea and no vomiting  Cardiovascular status: blood pressure returned to baseline and hemodynamically stable  Respiratory status: acceptable  Hydration status: euvolemic  Pain management: adequate    No notable events documented.

## 2024-05-15 ENCOUNTER — POSTPARTUM VISIT (OUTPATIENT)
Dept: OBGYN CLINIC | Age: 34
End: 2024-05-15

## 2024-05-15 VITALS
HEIGHT: 67 IN | SYSTOLIC BLOOD PRESSURE: 109 MMHG | WEIGHT: 194 LBS | DIASTOLIC BLOOD PRESSURE: 77 MMHG | BODY MASS INDEX: 30.45 KG/M2

## 2024-05-15 DIAGNOSIS — L91.8 MULTIPLE ACQUIRED SKIN TAGS: ICD-10-CM

## 2024-05-15 DIAGNOSIS — L29.9 GENERALIZED PRURITUS: ICD-10-CM

## 2024-05-15 DIAGNOSIS — Z98.891 S/P CESAREAN SECTION: ICD-10-CM

## 2024-05-15 PROCEDURE — 99024 POSTOP FOLLOW-UP VISIT: CPT | Performed by: STUDENT IN AN ORGANIZED HEALTH CARE EDUCATION/TRAINING PROGRAM

## 2024-05-15 RX ORDER — CALAMINE
LOTION (ML) TOPICAL
Qty: 1 EACH | Refills: 0 | Status: SHIPPED | OUTPATIENT
Start: 2024-05-15

## 2024-05-15 NOTE — PROGRESS NOTES
5/3/24    - VSS   - Overall patient is doing really well       - EPDS: 15>11   - Reviewed periods and lactation    - Family planning: partner is getting vasectomy    - Influenza vaccination: due this upcoming season    - S/p COVID-19 vaccination x1    - Last pap smear: 23 NILM HPV negative    - Indications for 2hr 75g GTT: not indicated    - Referral to derm placed for skin tag removal    - Rx calamine lotion sent to help with pruritus    - Continue routine postpartum care   Return in about 4 weeks (around 2024) for postpartum .    Counseling Completed:  Signs & Symptoms of mastitis and when to notify office discussed.  Secondary smoke risks including increased risks of respiratory problems, Sudden infant death syndrome, and potential malignancies discussed  Abstinence, family planning counseling and STD counseling discussed  Continue with post operative restrictions until 6 weeks post partum  No heavy lifting or [place] until 6 weeks post partum    Patient Active Problem List    Diagnosis Date Noted    PLTCS 21 M Apg 8/9 Wt 8#6 2021     Priority: High    39 weeks gestation of pregnancy 2024    RLTCS 5/3/24 F Apg 8/9 Wt 7#10 2024    BMI 34.0-34.9,adult 2024    History of  delivery 2024    High-risk pregnancy in third trimester 2024    GBS (group B Streptococcus carrier), +RV culture, currently pregnant 2024     Will need abx in labor      Hx gHTN (G2) 10/23/2023     Baby ASA @ 12 weeks      Hyperemesis gravidarum before end of 22 week gestation with dehydration 2023     Will go to the ED for IVF unable to schedule with infusion center      Hx SAB x 1 (G1) 10/20/2020         DO Al Bonds OB/GYN  5/15/2024, 4:51 PM

## 2024-05-22 ASSESSMENT — ENCOUNTER SYMPTOMS
BACK PAIN: 0
NAUSEA: 0
ABDOMINAL PAIN: 0
CONSTIPATION: 0
SHORTNESS OF BREATH: 0
VOMITING: 0
CHEST TIGHTNESS: 0

## 2024-05-23 ENCOUNTER — TELEPHONE (OUTPATIENT)
Dept: OBGYN CLINIC | Age: 34
End: 2024-05-23

## 2024-05-23 NOTE — TELEPHONE ENCOUNTER
Patient delivered May 3, 2024 and has been seen at 1 week for a postpartum visit along with dressing removal.  She was also seen at 2 weeks postpartum and has an upcoming 6-week postpartum visit scheduled.  She was scheduled to come in today but I called her to see if she needed anything or would like to cancel today's visit as it is not needed unless she has any concerns.  Told her she was welcome to come in if she would prefer to be seen today. she states she was also wondering why she was scheduled to come in today and feels she does not have any need to come in today.    She does state breast-feeding is going well.  States she is an over .  She is mostly pumping and occasionally having her daughter latch.  Discussed that if her supply is overwhelming she can cut down on the pump times.  Discussed that overproduction of milk can increase the risk of clogged ducts and mastitis.  She denies these symptoms today and will notify the office with concerns.    She does state she continues to have some anxiety.  She is not sure if she is interested in counseling or SSRI medication at this time.  Worried about SSRI medication while breast-feeding.  I reassured her this is safe and compatible with breast-feeding.  She did see a counselor after her first child was born and found this very helpful.  I encouraged her to consider this option.  She states she and her  are living with family at the moment.  She is also very close with her sister who has a similar aged infant.  She admits to good home support.  She feels her anxiety is manageable for now.  I reiterated the importance of notifying our office of any worsening mood symptoms or concerns.    6-week follow-up scheduled and she agrees to notify the office with any concerns prior to that

## 2024-06-19 ENCOUNTER — POSTPARTUM VISIT (OUTPATIENT)
Dept: OBGYN CLINIC | Age: 34
End: 2024-06-19

## 2024-06-19 VITALS
WEIGHT: 190 LBS | DIASTOLIC BLOOD PRESSURE: 73 MMHG | SYSTOLIC BLOOD PRESSURE: 111 MMHG | BODY MASS INDEX: 29.82 KG/M2 | HEART RATE: 80 BPM | HEIGHT: 67 IN

## 2024-06-19 DIAGNOSIS — N39.46 MIXED STRESS AND URGE URINARY INCONTINENCE: ICD-10-CM

## 2024-06-19 DIAGNOSIS — F41.8 POSTPARTUM ANXIETY: ICD-10-CM

## 2024-06-19 PROBLEM — Z3A.39 39 WEEKS GESTATION OF PREGNANCY: Status: RESOLVED | Noted: 2024-05-03 | Resolved: 2024-06-19

## 2024-06-19 PROBLEM — O09.93 HIGH-RISK PREGNANCY IN THIRD TRIMESTER: Status: RESOLVED | Noted: 2024-05-03 | Resolved: 2024-06-19

## 2024-06-19 PROCEDURE — 0503F POSTPARTUM CARE VISIT: CPT | Performed by: OBSTETRICS & GYNECOLOGY

## 2024-06-19 NOTE — PROGRESS NOTES
[facility], [facility]  [address]  [address]  [place] [address]  Dept: [phone]     Tisha Tracey  1:40 PM  24            The patient was seen. She has no chief complaints today. She delivered by  section on 5/3/24. She is  breast feeding and there is not any signs or symptoms of mastitis.  Her biggest stressor is trying to breast feed.  The patient completed the E.P.D.S. Evaluation form and scored 11.  She does have any signs or symptoms of post partum depression. She denies any suicidal thoughts with a plan, intent to harm others, and delusional ideas. She says her bleeding stopped and she has had a bit of discharge that smells.      Her pregnancy was complicated by:   Patient Active Problem List    Diagnosis Date Noted    PLTCS 21 M Apg 8/9 Wt 8#6 2021     Priority: High    RLTCS 5/3/24 F Apg 8/9 Wt 7#10 2024    BMI 34.0-34.9,adult 2024    History of  delivery 2024    GBS (group B Streptococcus carrier), +RV culture, currently pregnant 2024     Overview Note:     Will need abx in labor      Hx gHTN (G2) 10/23/2023     Overview Note:     Baby ASA @ 12 weeks      Hyperemesis gravidarum before end of 22 week gestation with dehydration 2023     Overview Note:     Will go to the ED for IVF unable to schedule with infusion center      Hx SAB x 1 (G1) 10/20/2020       She does admit to having good home support. Her bowels are regular and she denies any urinary tract symptomology.    OB History    Para Term  AB Living   3 2 2 0 1 2   SAB IAB Ectopic Molar Multiple Live Births   1 0 0 0 0 2           Blood pressure 111/73, pulse 80, height 1.702 m (5' 7\"), weight 86.2 kg (190 lb), last menstrual period 2023, currently breastfeeding.    Abdomen: Soft and non-tender; good bowel sounds; no guarding, rebound or rigidity; no CVA tenderness bilaterally.    Incision:

## 2024-07-02 NOTE — PROGRESS NOTES
She is to notify the office or go to the nearest Emergency Department if she experiences Abdominal Pain, Temperatures more than 101 F, Odiferous Vaginal Discharge, Dizziness or Shortness of breath.      Counseling Hormonal Based Birth Control:      The patient was seen and counseled on all forms of birth control both male and female  reversible and non. She is aware that hormonal based birth control may increase her risk of developing a blood clot which may increase her morbidity and or mortality. She was counseled on alternate non hormonal based contraception options.  We discussed that smoking and any hormonal based contraception may increase the patients risks of developing these life threatening blood clots. All patients are encouraged to stop smoking at the time of contraceptive counseling.  Cessation programs were reviewed.    The patient was instructed to use barrier contraception for sexually transmitted disease prevention.  The patient was also informed of antibiotics decreasing contraceptive efficacy and the need for barrier contraception from the onset of her antibiotic dosing and through a minimum of thirty days from antibiotic cessation.    The life threatening side effect profile was reviewed in detail this includes but is not limited to shortness of breath, chest pain, severe or persistent headaches, or calf pain.  If any of these occur the patient has been instructed to stop using her hormonal based contraception, notify the office, and go to the emergency department or call 911.    The patient denied any personal history of blood clots in her leg, lung, or heart and denied any family history of stroke, TIA, sudden cardiac death < 40 y.o.,pulmonary embolism, or deep venous thrombosis.         ASSESSMENT:  IUD Insertion   Diagnosis Orders   1. Encounter for IUD insertion  NC INSERTION INTRAUTERINE DEVICE IUD    levonorgestrel (MIRENA) IUD 52 mg 1 each        Patient Active Problem List    Diagnosis

## 2024-07-03 ENCOUNTER — PROCEDURE VISIT (OUTPATIENT)
Dept: OBGYN CLINIC | Age: 34
End: 2024-07-03
Payer: COMMERCIAL

## 2024-07-03 VITALS — DIASTOLIC BLOOD PRESSURE: 72 MMHG | WEIGHT: 191 LBS | BODY MASS INDEX: 29.91 KG/M2 | SYSTOLIC BLOOD PRESSURE: 102 MMHG

## 2024-07-03 DIAGNOSIS — Z30.430 ENCOUNTER FOR IUD INSERTION: Primary | ICD-10-CM

## 2024-07-03 PROCEDURE — 58300 INSERT INTRAUTERINE DEVICE: CPT | Performed by: OBSTETRICS & GYNECOLOGY

## 2024-07-25 ENCOUNTER — TELEPHONE (OUTPATIENT)
Dept: OBGYN CLINIC | Age: 34
End: 2024-07-25

## 2024-07-25 DIAGNOSIS — B37.89 YEAST INFECTION OF NIPPLE, POSTPARTUM: Primary | ICD-10-CM

## 2024-07-25 NOTE — TELEPHONE ENCOUNTER
Miconazole cream ordered to pharmacy. Use twice daily on nipples.    DO Al Alvarez Ob/Gyn   7/25/2024, 4:25 PM

## 2024-07-25 NOTE — TELEPHONE ENCOUNTER
GYN pt who was last seen in office 7/3  Last pp visit 6/19/24    Pt calling in as she is still currently breastfeeding baby and baby has some thrush and baby's doctor told her to reach out to us since the baby has thrush she will also need treated for yeast.     Pls advise.

## 2024-08-01 ENCOUNTER — TELEPHONE (OUTPATIENT)
Dept: OBGYN CLINIC | Age: 34
End: 2024-08-01

## 2024-08-01 NOTE — TELEPHONE ENCOUNTER
Being away due to work plus anxiety, and children.  She had an experience where a baby cries and it sent her into a tail spin of overwhelming feelings.    Pt notices it when just sitting in care  Unresolved anxiety of not being able to get it together  Unwarranted, heart rate elevates extremely high, pt feels dizzy and stressed.   With cessation that goes down her back and almost takes over her body.      Please advise.     Pt is not currently on anything however pt isnt sure if a medication should be started or if she should come back in.     If pt does not answer she has approved leaving a voicemail.

## 2024-08-09 ENCOUNTER — HOSPITAL ENCOUNTER (OUTPATIENT)
Age: 34
Setting detail: SPECIMEN
Discharge: HOME | End: 2024-08-09

## 2024-08-09 ENCOUNTER — OFFICE VISIT (OUTPATIENT)
Dept: OBGYN CLINIC | Age: 34
End: 2024-08-09

## 2024-08-09 VITALS
BODY MASS INDEX: 29.38 KG/M2 | DIASTOLIC BLOOD PRESSURE: 71 MMHG | HEIGHT: 67 IN | HEART RATE: 69 BPM | WEIGHT: 187.2 LBS | SYSTOLIC BLOOD PRESSURE: 107 MMHG

## 2024-08-09 DIAGNOSIS — F41.9 ANXIETY: ICD-10-CM

## 2024-08-09 DIAGNOSIS — F41.8 POSTPARTUM ANXIETY: ICD-10-CM

## 2024-08-09 DIAGNOSIS — R73.09 LOW GLUCOSE LEVEL: ICD-10-CM

## 2024-08-09 LAB
25(OH)D3 SERPL-MCNC: 24.7 NG/ML (ref 30–100)
ALBUMIN SERPL-MCNC: 4.7 G/DL (ref 3.5–5.2)
ALBUMIN/GLOB SERPL: 1 {RATIO} (ref 1–2.5)
ALP SERPL-CCNC: 110 U/L (ref 35–104)
ALT SERPL-CCNC: 35 U/L (ref 10–35)
ANION GAP SERPL CALCULATED.3IONS-SCNC: 10 MMOL/L (ref 9–16)
AST SERPL-CCNC: 34 U/L (ref 10–35)
BILIRUB SERPL-MCNC: 0.4 MG/DL (ref 0–1.2)
BUN SERPL-MCNC: 11 MG/DL (ref 6–20)
CALCIUM SERPL-MCNC: 10.2 MG/DL (ref 8.6–10.4)
CHLORIDE SERPL-SCNC: 104 MMOL/L (ref 98–107)
CO2 SERPL-SCNC: 27 MMOL/L (ref 20–31)
CREAT SERPL-MCNC: 0.9 MG/DL (ref 0.5–0.9)
ERYTHROCYTE [DISTWIDTH] IN BLOOD BY AUTOMATED COUNT: 12.9 % (ref 11.8–14.4)
GFR, ESTIMATED: 85 ML/MIN/1.73M2
GLUCOSE SERPL-MCNC: 67 MG/DL (ref 74–99)
HCT VFR BLD AUTO: 44.9 % (ref 36.3–47.1)
HGB BLD-MCNC: 14.6 G/DL (ref 11.9–15.1)
MCH RBC QN AUTO: 28.2 PG (ref 25.2–33.5)
MCHC RBC AUTO-ENTMCNC: 32.5 G/DL (ref 28.4–34.8)
MCV RBC AUTO: 86.7 FL (ref 82.6–102.9)
NRBC BLD-RTO: 0 PER 100 WBC
PLATELET # BLD AUTO: 247 K/UL (ref 138–453)
PMV BLD AUTO: 11.4 FL (ref 8.1–13.5)
POTASSIUM SERPL-SCNC: 4.6 MMOL/L (ref 3.7–5.3)
PROT SERPL-MCNC: 7.9 G/DL (ref 6.6–8.7)
RBC # BLD AUTO: 5.18 M/UL (ref 3.95–5.11)
SODIUM SERPL-SCNC: 141 MMOL/L (ref 136–145)
TSH SERPL DL<=0.05 MIU/L-ACNC: 0.82 UIU/ML (ref 0.27–4.2)
WBC OTHER # BLD: 5.5 K/UL (ref 3.5–11.3)

## 2024-08-09 RX ORDER — SERTRALINE HYDROCHLORIDE 25 MG/1
25 TABLET, FILM COATED ORAL DAILY
Qty: 30 TABLET | Refills: 3 | Status: SHIPPED | OUTPATIENT
Start: 2024-08-09

## 2024-08-09 ASSESSMENT — PATIENT HEALTH QUESTIONNAIRE - PHQ9
3. TROUBLE FALLING OR STAYING ASLEEP: NOT AT ALL
SUM OF ALL RESPONSES TO PHQ QUESTIONS 1-9: 9
9. THOUGHTS THAT YOU WOULD BE BETTER OFF DEAD, OR OF HURTING YOURSELF: NOT AT ALL
4. FEELING TIRED OR HAVING LITTLE ENERGY: MORE THAN HALF THE DAYS
SUM OF ALL RESPONSES TO PHQ QUESTIONS 1-9: 9
6. FEELING BAD ABOUT YOURSELF - OR THAT YOU ARE A FAILURE OR HAVE LET YOURSELF OR YOUR FAMILY DOWN: MORE THAN HALF THE DAYS
10. IF YOU CHECKED OFF ANY PROBLEMS, HOW DIFFICULT HAVE THESE PROBLEMS MADE IT FOR YOU TO DO YOUR WORK, TAKE CARE OF THINGS AT HOME, OR GET ALONG WITH OTHER PEOPLE: SOMEWHAT DIFFICULT
7. TROUBLE CONCENTRATING ON THINGS, SUCH AS READING THE NEWSPAPER OR WATCHING TELEVISION: MORE THAN HALF THE DAYS
SUM OF ALL RESPONSES TO PHQ QUESTIONS 1-9: 9
2. FEELING DOWN, DEPRESSED OR HOPELESS: SEVERAL DAYS
SUM OF ALL RESPONSES TO PHQ9 QUESTIONS 1 & 2: 2
1. LITTLE INTEREST OR PLEASURE IN DOING THINGS: SEVERAL DAYS
SUM OF ALL RESPONSES TO PHQ QUESTIONS 1-9: 9
8. MOVING OR SPEAKING SO SLOWLY THAT OTHER PEOPLE COULD HAVE NOTICED. OR THE OPPOSITE, BEING SO FIGETY OR RESTLESS THAT YOU HAVE BEEN MOVING AROUND A LOT MORE THAN USUAL: NOT AT ALL
5. POOR APPETITE OR OVEREATING: SEVERAL DAYS

## 2024-08-09 ASSESSMENT — ENCOUNTER SYMPTOMS
ABDOMINAL DISTENTION: 0
ANAL BLEEDING: 0
ABDOMINAL PAIN: 0
CONSTIPATION: 0
BLOOD IN STOOL: 0
SHORTNESS OF BREATH: 0

## 2024-08-09 NOTE — PROGRESS NOTES
[facility]  [facility] [place]  [address]  [address]  [place] [address]  Dept: [phone]           Gynecologic problem visit    Patient: Sandro Tracey  Primary Care Physician: Soo Ricci, THAIS - CNP   Chief Complaint   Patient presents with    Follow-up     anxiety     HPI: Sandro Tracey is a 34 y.o.  who presents today as a returning patient for evaluation of anxiety which has worsened since delivering her baby May 3, 2024 - about 5 months ago. 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 She has a  while she is at work. Her son will be starting part time  soon. Her family and  are supportive.     Today she states it is very hard to be away from her baby.  She was recently away from the home, heard someone else's baby crying which caused her to be overcome with worry -so much so that she became dizzy. She is breastfeeding and plans to continue breast-feeding.  She has never tried any antianxiety medication previously.     Previously she did attend some counseling which was helpful but expensive     Iud in place, has not had return of menses.     OBSTETRICAL & GYNECOLOGICAL HISTORY:  OB History    Para Term  AB Living   3 2 2 0 1 2   SAB IAB Ectopic Molar Multiple Live Births   1 0 0 0 0 2      # Outcome Date GA Lbr Davon/2nd Weight Sex Delivery Anes PTL Lv   3 Term 24 39w2d  3.48 kg (7 lb 10.8 oz) F CS-LTranv Spinal N VIOLA      Name: EVELIO,GIRL SANDRO      Apgar1: 8  Apgar5: 9   2 Term 21 41w1d  3.81 kg (8 lb 6.4 oz) M CS-LTranv EPI N VIOLA      Birth Comments: never dilated past 1 cm      Complications: Failure to Progress in First Stage      Name: Rock      Apgar1: 8  Apgar5: 9   1 SAB 2020              Birth Comments: manual removal with spec exam and ring forceps to remove products from cx.     No LMP recorded.    FAMILY HISTORY:  family history includes Congenital Heart Defect

## 2024-09-03 ENCOUNTER — TELEPHONE (OUTPATIENT)
Dept: OBGYN CLINIC | Age: 34
End: 2024-09-03

## 2024-09-03 DIAGNOSIS — B37.89 CANDIDIASIS OF BREAST: Primary | ICD-10-CM

## 2024-09-03 RX ORDER — MICONAZOLE NITRATE 20 MG/G
CREAM TOPICAL
Qty: 198 G | Refills: 1 | Status: SHIPPED | OUTPATIENT
Start: 2024-09-03

## 2024-09-03 NOTE — TELEPHONE ENCOUNTER
Recent pregnancy 5.3.24    Pt has been exclusively breastfeeding. Pt states baby recently had Thrush and no matter what she seems to do pumping or otherwise it has not helped. Pt unsure if she has thrush now or a yeast infection on her breast. She states pain is 9/10 especially if she is pumping. Is there an antibiotic she could take? Or ointment cream to use?    Please advise.

## 2024-09-03 NOTE — TELEPHONE ENCOUNTER
Rx sent for Miconazole cream. Apply twice daily to breasts.    DO [facility]   9/3/2024, 3:03 PM

## 2024-10-04 ENCOUNTER — OFFICE VISIT (OUTPATIENT)
Dept: DERMATOLOGY | Age: 34
End: 2024-10-04

## 2024-10-04 VITALS
WEIGHT: 219 LBS | HEIGHT: 71 IN | SYSTOLIC BLOOD PRESSURE: 107 MMHG | OXYGEN SATURATION: 99 % | TEMPERATURE: 97.7 F | BODY MASS INDEX: 30.66 KG/M2 | HEART RATE: 80 BPM | DIASTOLIC BLOOD PRESSURE: 78 MMHG

## 2024-10-04 DIAGNOSIS — D22.9 IRRITATED NEVUS: Primary | ICD-10-CM

## 2024-10-04 DIAGNOSIS — D22.9 MULTIPLE BENIGN NEVI: ICD-10-CM

## 2024-10-04 RX ORDER — LIDOCAINE HYDROCHLORIDE AND EPINEPHRINE 10; 10 MG/ML; UG/ML
0.5 INJECTION, SOLUTION INFILTRATION; PERINEURAL ONCE
Status: SHIPPED | OUTPATIENT
Start: 2024-10-04

## 2024-10-04 NOTE — PROGRESS NOTES
Dermatology Patient Note  [facility] DERMATOLOGY  [address]  [address]  [place] [address]  Dept: [phone]  Dept Fax: [phone]      VISITDATE: 10/4/2024   REFERRING PROVIDER: Missy Veronica DO Kelsey Alexy is a 34 y.o. female  who presents today in the office for:    New Patient (Patient presents in the office today as a new patient for a mole on her right groin area that her obgyn is concerned with. Has been there for 10 plus years. It has grown in size and changed color. She also has a mole behind her left ear that is large. This has been there for her entire life. No pain or itching with either of these. )      HISTORY OF PRESENT ILLNESS:  As above.  Pt states that the lesion on the left neck gets tender at times, in particular when her hair wraps around it.    MEDICAL PROBLEMS:  Patient Active Problem List    Diagnosis Date Noted    PLTCS 21 M Apg 8/9 Wt 8#6 2021     Priority: High    RLTCS 5/3/24 F Apg 8/9 Wt 7#10 2024    BMI 34.0-34.9,adult 2024    History of  delivery 2024    GBS (group B Streptococcus carrier), +RV culture, currently pregnant 2024     Will need abx in labor      Hx gHTN (G2) 10/23/2023     Baby ASA @ 12 weeks      Hyperemesis gravidarum before end of 22 week gestation with dehydration 2023     Will go to the ED for IVF unable to schedule with infusion center      Hx SAB x 1 (G1) 10/20/2020       CURRENT MEDICATIONS:   Current Outpatient Medications   Medication Sig Dispense Refill    calamine lotion Apply topically as needed. 1 each 0    ferrous sulfate (IRON 325) 325 (65 Fe) MG tablet Take 1 tablet by mouth every other day 180 tablet 0    acetaminophen (TYLENOL) 500 MG tablet Take 2 tablets by mouth every 6 hours as needed for Pain 60 tablet 1    ibuprofen (ADVIL;MOTRIN) 600 MG tablet Take 1 tablet by mouth every 6 hours as needed for Pain 40

## 2024-10-25 ENCOUNTER — TELEPHONE (OUTPATIENT)
Dept: FAMILY MEDICINE CLINIC | Age: 34
End: 2024-10-25

## 2024-10-25 NOTE — TELEPHONE ENCOUNTER
----- Message from Jimy VELARDE sent at 10/24/2024  4:06 PM EDT -----  Regarding: ECC Escalation To Practice  ECC Escalation To Practice      Type of Escalation: Red Flag Symptom  --------------------------------------------------------------------------------------------------------------------------    Information for Provider:  Patient is looking for appointment for: Symptom Swelling   Reasons for Message: Unable to reach practice     Additional Information Swelling in her foot   --------------------------------------------------------------------------------------------------------------------------    Relationship to Patient: Self     Call Back Info: OK to leave message on voicemail  Preferred Call Back Number: Phone [phone]

## 2024-10-30 ENCOUNTER — OFFICE VISIT (OUTPATIENT)
Dept: FAMILY MEDICINE CLINIC | Age: 34
End: 2024-10-30
Payer: COMMERCIAL

## 2024-10-30 VITALS
HEIGHT: 71 IN | WEIGHT: 184 LBS | DIASTOLIC BLOOD PRESSURE: 69 MMHG | BODY MASS INDEX: 25.76 KG/M2 | SYSTOLIC BLOOD PRESSURE: 113 MMHG | HEART RATE: 76 BPM

## 2024-10-30 DIAGNOSIS — M79.672 LEFT FOOT PAIN: Primary | ICD-10-CM

## 2024-10-30 DIAGNOSIS — M25.532 LEFT WRIST PAIN: ICD-10-CM

## 2024-10-30 PROCEDURE — 99214 OFFICE O/P EST MOD 30 MIN: CPT | Performed by: NURSE PRACTITIONER

## 2024-10-30 SDOH — ECONOMIC STABILITY: FOOD INSECURITY: WITHIN THE PAST 12 MONTHS, YOU WORRIED THAT YOUR FOOD WOULD RUN OUT BEFORE YOU GOT MONEY TO BUY MORE.: NEVER TRUE

## 2024-10-30 SDOH — ECONOMIC STABILITY: FOOD INSECURITY: WITHIN THE PAST 12 MONTHS, THE FOOD YOU BOUGHT JUST DIDN'T LAST AND YOU DIDN'T HAVE MONEY TO GET MORE.: NEVER TRUE

## 2024-10-30 SDOH — ECONOMIC STABILITY: INCOME INSECURITY: HOW HARD IS IT FOR YOU TO PAY FOR THE VERY BASICS LIKE FOOD, HOUSING, MEDICAL CARE, AND HEATING?: NOT HARD AT ALL

## 2024-10-30 ASSESSMENT — PATIENT HEALTH QUESTIONNAIRE - PHQ9
SUM OF ALL RESPONSES TO PHQ QUESTIONS 1-9: 1
6. FEELING BAD ABOUT YOURSELF - OR THAT YOU ARE A FAILURE OR HAVE LET YOURSELF OR YOUR FAMILY DOWN: NOT AT ALL
7. TROUBLE CONCENTRATING ON THINGS, SUCH AS READING THE NEWSPAPER OR WATCHING TELEVISION: SEVERAL DAYS
3. TROUBLE FALLING OR STAYING ASLEEP: NOT AT ALL
SUM OF ALL RESPONSES TO PHQ9 QUESTIONS 1 & 2: 0
SUM OF ALL RESPONSES TO PHQ QUESTIONS 1-9: 1
2. FEELING DOWN, DEPRESSED OR HOPELESS: NOT AT ALL
8. MOVING OR SPEAKING SO SLOWLY THAT OTHER PEOPLE COULD HAVE NOTICED. OR THE OPPOSITE, BEING SO FIGETY OR RESTLESS THAT YOU HAVE BEEN MOVING AROUND A LOT MORE THAN USUAL: NOT AT ALL
10. IF YOU CHECKED OFF ANY PROBLEMS, HOW DIFFICULT HAVE THESE PROBLEMS MADE IT FOR YOU TO DO YOUR WORK, TAKE CARE OF THINGS AT HOME, OR GET ALONG WITH OTHER PEOPLE: NOT DIFFICULT AT ALL
SUM OF ALL RESPONSES TO PHQ QUESTIONS 1-9: 1
SUM OF ALL RESPONSES TO PHQ QUESTIONS 1-9: 1
5. POOR APPETITE OR OVEREATING: NOT AT ALL
1. LITTLE INTEREST OR PLEASURE IN DOING THINGS: NOT AT ALL
9. THOUGHTS THAT YOU WOULD BE BETTER OFF DEAD, OR OF HURTING YOURSELF: NOT AT ALL
4. FEELING TIRED OR HAVING LITTLE ENERGY: NOT AT ALL

## 2024-10-30 ASSESSMENT — ENCOUNTER SYMPTOMS
ABDOMINAL PAIN: 0
COLOR CHANGE: 0
CHEST TIGHTNESS: 0
CONSTIPATION: 0
DIARRHEA: 0
SHORTNESS OF BREATH: 0

## 2024-10-30 NOTE — PROGRESS NOTES
Maintenance Activities Due, Referral Notes (if available) were reviewed per writer     [x] Reviewed Depression screening if taken or valid today or any other valid screening tool (others seen below) Interpretation of Total Score DepressionSeverity: 1-4 = Minimal depression, 5-9 = Mild depression, 10-14 = Moderate depression, 15-19 = Moderately severe depression, 20-27 =Severe depression        10/30/2024     2:35 PM 8/9/2024    10:14 AM 5/27/2022     9:37 AM 5/18/2022    10:36 AM   PHQ Scores   PHQ2 Score 0 2 2 2   PHQ9 Score 1 9 2 2     Interpretation of Total Score Depression Severity: 1-4 = Minimal depression, 5-9 = Mild depression, 10-14 = Moderate depression, 15-19 = Moderately severe depression, 20-27 = Severe depression     Review of Systems (Subjective)     Review of Systems   Constitutional:  Negative for activity change, appetite change and unexpected weight change.   Respiratory:  Negative for chest tightness and shortness of breath.    Cardiovascular:  Negative for chest pain and palpitations.   Gastrointestinal:  Negative for abdominal pain, constipation and diarrhea.   Genitourinary:  Negative for difficulty urinating and dysuria.   Musculoskeletal:  Positive for arthralgias, gait problem (d/t pain) and joint swelling. Negative for myalgias.   Skin:  Negative for color change and rash.   Neurological:  Negative for dizziness, numbness and headaches.   Psychiatric/Behavioral:  Negative for dysphoric mood and sleep disturbance. The patient is not nervous/anxious.        See HPI     Physical Assessment (Objective)     /69 (Site: Left Upper Arm, Position: Sitting, Cuff Size: Medium Adult)   Pulse 76   Ht 1.803 m (5' 11\")   Wt 83.5 kg (184 lb)   BMI 25.66 kg/m²      Physical Exam  Vitals reviewed.   HENT:      Head: Normocephalic and atraumatic.      Right Ear: External ear normal.      Left Ear: External ear normal.      Nose: Nose normal.      Mouth/Throat:      Mouth: Mucous membranes are

## 2024-12-03 ENCOUNTER — TELEPHONE (OUTPATIENT)
Dept: FAMILY MEDICINE CLINIC | Age: 34
End: 2024-12-03

## 2024-12-03 NOTE — TELEPHONE ENCOUNTER
HX: Stomach problem/discomfort/pain. Level of pain, daily # 4 in the am x 1 week.    Sometime has increased pain up to # 7 randomly.     Stated has history of urinary urgency x 1.5 years.    Was last seen in late Oct, 2024 but for something different.

## 2024-12-04 DIAGNOSIS — R39.15 URINARY URGENCY: Primary | ICD-10-CM

## 2025-01-13 ENCOUNTER — TELEPHONE (OUTPATIENT)
Age: 35
End: 2025-01-13

## 2025-01-13 NOTE — TELEPHONE ENCOUNTER
Patient cancelled Dermatology appointment scheduled for 1/13/2025. Patient wants to know if the shave removal behind left ear Procedure is covered under her insurance plan.  She will reschedule appointment if the Procedure is covered under her insurance plan.

## 2025-01-13 NOTE — TELEPHONE ENCOUNTER
Is it symptomatic?  If it is tender or itching, it will be covered.  If it is not, then it is considered cosmetic only, and will not be covered.

## 2025-05-21 DIAGNOSIS — R39.9 UTI SYMPTOMS: Primary | ICD-10-CM

## 2025-05-29 ENCOUNTER — OFFICE VISIT (OUTPATIENT)
Dept: OBGYN CLINIC | Age: 35
End: 2025-05-29
Payer: COMMERCIAL

## 2025-05-29 ENCOUNTER — HOSPITAL ENCOUNTER (OUTPATIENT)
Age: 35
Setting detail: SPECIMEN
Discharge: HOME | End: 2025-05-29

## 2025-05-29 VITALS
DIASTOLIC BLOOD PRESSURE: 75 MMHG | SYSTOLIC BLOOD PRESSURE: 123 MMHG | HEART RATE: 77 BPM | WEIGHT: 191 LBS | HEIGHT: 71 IN | BODY MASS INDEX: 26.74 KG/M2

## 2025-05-29 DIAGNOSIS — R19.00 PELVIC FULLNESS IN FEMALE: ICD-10-CM

## 2025-05-29 DIAGNOSIS — R39.15 URINARY URGENCY: ICD-10-CM

## 2025-05-29 DIAGNOSIS — Z30.432 ENCOUNTER FOR IUD REMOVAL: ICD-10-CM

## 2025-05-29 DIAGNOSIS — Z98.891 HISTORY OF CESAREAN DELIVERY: Primary | ICD-10-CM

## 2025-05-29 DIAGNOSIS — R35.1 NOCTURIA: ICD-10-CM

## 2025-05-29 DIAGNOSIS — R63.5 WEIGHT GAIN: ICD-10-CM

## 2025-05-29 PROCEDURE — 58301 REMOVE INTRAUTERINE DEVICE: CPT

## 2025-05-29 NOTE — PROGRESS NOTES
[facility]  [address]  [address]  [place] [address]      Procedure Note    Tisha Tracey  2025                       Primary Care Physician: Soo Ricci APRN - CNP      Subjective:   Tisha Tracey 35 y.o. female  is here for previously scheduled IUD removal.    She compains of excessive nocturia and Incomplete emptying of her bladder.      had vasectomy - she is Unsure if he returned for sperm count evaluation post-operatively.     Vitals:   Vitals:    25 1344   BP: 123/75   BP Site: Right Upper Arm   Patient Position: Sitting   BP Cuff Size: Medium Adult   Pulse: 77   Weight: 86.6 kg (191 lb)   Height: 1.803 m (5' 11\")       Procedure: IUD removal    Indications: patient's request    Procedure Details:   The patient was counseled on the procedure. Risks, benefits and alternatives were reviewed. The patient is aware that this is diagnostic and not curative and a second procedure may be needed. A consent was reviewed and obtained.    Removal of IUD  A sterile speculum was placed without incident and the string was identified at the cervical portio.The site was then cleansed with Betadine and the string was grasped with a ring forcep and the IUD was removed without incident.  The IUD was not sent to pathology.  Post procedure restrictions were reviewed and given to the patient.  She was instructed to use barrier protection for sexually transmitted disease prevention.     Assessment & Plan:  Tisha was seen today for procedure.    Diagnoses and all orders for this visit:    History of  delivery  -     [facility] [place]    Encounter for IUD removal  -     REMOVE INTRAUTERINE DEVICE    Weight gain  -     [facility]    Urinary urgency  -     [facility]  -     Urinalysis; Future  -     Culture, Urine; Future    Nocturia  -     [facility] [place]  -     Urinalysis;

## 2025-05-30 LAB
BACTERIA URNS QL MICRO: ABNORMAL
BILIRUB UR QL STRIP: NEGATIVE
CASTS #/AREA URNS LPF: ABNORMAL /LPF (ref 0–8)
CLARITY UR: CLEAR
COLOR UR: YELLOW
EPI CELLS #/AREA URNS HPF: ABNORMAL /HPF (ref 0–5)
GLUCOSE UR STRIP-MCNC: NEGATIVE MG/DL
HGB UR QL STRIP.AUTO: ABNORMAL
KETONES UR STRIP-MCNC: NEGATIVE MG/DL
LEUKOCYTE ESTERASE UR QL STRIP: ABNORMAL
NITRITE UR QL STRIP: NEGATIVE
PH UR STRIP: 5.5 [PH] (ref 5–8)
PROT UR STRIP-MCNC: NEGATIVE MG/DL
RBC #/AREA URNS HPF: ABNORMAL /HPF (ref 0–4)
SP GR UR STRIP: 1.01 (ref 1–1.03)
UROBILINOGEN UR STRIP-ACNC: NORMAL EU/DL (ref 0–1)
WBC #/AREA URNS HPF: ABNORMAL /HPF (ref 0–5)

## 2025-05-31 LAB
MICROORGANISM SPEC CULT: ABNORMAL
SERVICE CMNT-IMP: ABNORMAL
SPECIMEN DESCRIPTION: ABNORMAL

## 2025-06-03 ENCOUNTER — RESULTS FOLLOW-UP (OUTPATIENT)
Dept: OBGYN CLINIC | Age: 35
End: 2025-06-03

## 2025-06-03 RX ORDER — CEPHALEXIN 500 MG/1
500 CAPSULE ORAL 4 TIMES DAILY
Qty: 28 CAPSULE | Refills: 0 | Status: SHIPPED | OUTPATIENT
Start: 2025-06-03 | End: 2025-06-10

## 2025-06-18 ENCOUNTER — HOSPITAL ENCOUNTER (OUTPATIENT)
Dept: PHYSICAL THERAPY | Facility: CLINIC | Age: 35
Setting detail: THERAPIES SERIES
Discharge: HOME | End: 2025-06-18

## 2025-06-18 NOTE — FLOWSHEET NOTE
[] [facility]  Outpatient Rehabilitation &  Therapy  [address]  [phone]  [phone] [x] [facility]  Outpatient Rehabilitation &  Therapy  [address]  P: [phone]  F: [phone] [] [facility]  Outpatient Rehabilitation &  Therapy  [address]  P: [phone]  F: [phone] [] [facility]  Outpatient Rehabilitation &  Therapy  [address]  [phone]  [phone] [] [facility]  Outpatient Rehabilitation &  Therapy  [address]   P: [phone]  F: [phone]  [] [facility]  Outpatient Rehabilitation &  Therapy  [address]  P: [phone]  F: [phone] [] [facility]  Outpatient Rehabilitation &  Therapy  [address]  [address]  P: [phone]  F: [phone] [] [facility]  Outpatient Rehabilitation &  Therapy  [address]  P: [phone]  F: [phone] [] [facility]  Outpatient Rehabilitation &  Therapy  [address]  P: [phone]  F: [phone]  [] [facility] Outpatient Rehabilitation &  Therapy  [address]  P: [phone]  F: [phone] [] [facility] Outpatient Rehabilitation &  Therapy  [address]  P: [phone]   F: [phone]      Therapy Cancel/No Show note    Date: 2025  Patient: Tisha Tracey  : 1990  MRN: [id number]    Cancels/No Shows to date:     For today's appointment patient:    [x]  Cancelled    [] Rescheduled appointment    [] No-show     Reason given by patient:    []  Patient ill    []  Conflicting appointment    [] No transportation      [x] Conflict with work    [] No reason given    [] Weather related    [] COVID-19    [] Other:      Comments:  will call back to reschedule.       [] Next appointment

## 2025-07-07 ENCOUNTER — HOSPITAL ENCOUNTER (OUTPATIENT)
Age: 35
Setting detail: SPECIMEN
Discharge: HOME | End: 2025-07-07

## 2025-07-07 ENCOUNTER — OFFICE VISIT (OUTPATIENT)
Dept: FAMILY MEDICINE CLINIC | Age: 35
End: 2025-07-07
Payer: COMMERCIAL

## 2025-07-07 VITALS
WEIGHT: 191 LBS | HEART RATE: 67 BPM | SYSTOLIC BLOOD PRESSURE: 108 MMHG | HEIGHT: 71 IN | BODY MASS INDEX: 26.74 KG/M2 | DIASTOLIC BLOOD PRESSURE: 67 MMHG

## 2025-07-07 DIAGNOSIS — R51.9 DAILY HEADACHE: Primary | ICD-10-CM

## 2025-07-07 DIAGNOSIS — R35.0 URINARY FREQUENCY: ICD-10-CM

## 2025-07-07 LAB
BILIRUB UR QL STRIP: NEGATIVE
CLARITY UR: CLEAR
COLOR UR: YELLOW
COMMENT: ABNORMAL
GLUCOSE UR STRIP-MCNC: NEGATIVE MG/DL
HGB UR QL STRIP.AUTO: NEGATIVE
KETONES UR STRIP-MCNC: ABNORMAL MG/DL
LEUKOCYTE ESTERASE UR QL STRIP: NEGATIVE
NITRITE UR QL STRIP: NEGATIVE
PH UR STRIP: 5.5 [PH] (ref 5–8)
PROT UR STRIP-MCNC: NEGATIVE MG/DL
SP GR UR STRIP: 1.01 (ref 1–1.03)
UROBILINOGEN UR STRIP-ACNC: NORMAL EU/DL (ref 0–1)

## 2025-07-07 PROCEDURE — 99214 OFFICE O/P EST MOD 30 MIN: CPT | Performed by: NURSE PRACTITIONER

## 2025-07-07 SDOH — ECONOMIC STABILITY: FOOD INSECURITY: WITHIN THE PAST 12 MONTHS, THE FOOD YOU BOUGHT JUST DIDN'T LAST AND YOU DIDN'T HAVE MONEY TO GET MORE.: NEVER TRUE

## 2025-07-07 SDOH — ECONOMIC STABILITY: FOOD INSECURITY: WITHIN THE PAST 12 MONTHS, YOU WORRIED THAT YOUR FOOD WOULD RUN OUT BEFORE YOU GOT MONEY TO BUY MORE.: NEVER TRUE

## 2025-07-07 ASSESSMENT — ENCOUNTER SYMPTOMS
SHORTNESS OF BREATH: 0
ABDOMINAL PAIN: 0
DIARRHEA: 0
COLOR CHANGE: 0
CHEST TIGHTNESS: 0
CONSTIPATION: 0

## 2025-07-07 ASSESSMENT — PATIENT HEALTH QUESTIONNAIRE - PHQ9
2. FEELING DOWN, DEPRESSED OR HOPELESS: SEVERAL DAYS
1. LITTLE INTEREST OR PLEASURE IN DOING THINGS: NOT AT ALL
SUM OF ALL RESPONSES TO PHQ QUESTIONS 1-9: 1

## 2025-07-07 NOTE — PROGRESS NOTES
Tisha Tracey is a 35 y.o. female who presents in office today with Self follow up on chronic conditions including:   Patient Active Problem List   Diagnosis    Hx SAB x 1 (G1)    PLTCS 21 M Apg 8/9 Wt 8#6    Hyperemesis gravidarum before end of 22 week gestation with dehydration    Hx gHTN (G2)    GBS (group B Streptococcus carrier), +RV culture, currently pregnant    RLTCS 5/3/24 F Apg 8/9 Wt 7#10    BMI 34.0-34.9,adult    History of  delivery       Chief Complaint   Patient presents with    Headache     Headaches, lack of sleep, insomnia.     Urinary Frequency at Night     Up a lot of night having to go to the bathroom         History of Present Illness:     HPI    History of Present Illness  The patient is a 35-year-old female who presents with concerns of headaches and urinary frequency.    She has been experiencing headaches, which she suspects may be related to caffeine intake. These headaches occur in the morning and occasionally in the evening. She is uncertain if they may also be related to dehydration or stress. She has noticed that abstaining from caffeine leads to severe headaches, and consuming coffee later in the day makes her feel nauseous. She consumes at least one iced coffee daily, sometimes more. She started a new job in 2025, which has been stressful. She has experienced panic attacks due to various stressors.  Also wondering if headaches may have hormonal component as she recently stopped using oral contraceptive following her 's vasectomy.  She did not experience headaches while taking OCP.  The onset of these headaches was sudden, starting about a month ago, and they have been moderate to severe intensity. She reports no issues with her vision. She has been trying to increase her fluid intake, but this has led to frequent urination, particularly in the evenings. She experienced a period of 7 consecutive days with headaches, necessitating daily medication. The

## 2025-07-09 DIAGNOSIS — B96.20 E. COLI UTI: Primary | ICD-10-CM

## 2025-07-09 DIAGNOSIS — N39.0 E. COLI UTI: Primary | ICD-10-CM

## 2025-07-09 LAB
MICROORGANISM SPEC CULT: ABNORMAL
SERVICE CMNT-IMP: ABNORMAL
SPECIMEN DESCRIPTION: ABNORMAL

## 2025-07-09 RX ORDER — SULFAMETHOXAZOLE AND TRIMETHOPRIM 800; 160 MG/1; MG/1
1 TABLET ORAL 2 TIMES DAILY
Qty: 6 TABLET | Refills: 0 | Status: SHIPPED | OUTPATIENT
Start: 2025-07-09 | End: 2025-07-12

## (undated) DEVICE — SOLUTION IV IRRIG WATER 500ML POUR BRL ST 2F7113

## (undated) DEVICE — SOLUTION SOD CHL 0.9% 1000ML

## (undated) DEVICE — 3M™ STERI-STRIP™ ANTIMICROBIAL SKIN CLOSURES 1 IN X 5 IN, 25/CAR, 4 CAR/CASE A1848: Brand: 3M™ STERI-STRIP™

## (undated) DEVICE — SWAB MEDICATED TINC BENZ

## (undated) DEVICE — TOWEL SURG W16XL26IN WHT NONFENESTRATED ST 2 PER PK

## (undated) DEVICE — KENDALL SCD EXPRESS SLEEVES, KNEE LENGTH, MEDIUM: Brand: KENDALL SCD

## (undated) DEVICE — SUTURE COAT VCRL SZ 0 L36IN ABSRB VLT CTX L48MM TAPERPOINT J370H